# Patient Record
Sex: FEMALE | Race: WHITE | Employment: FULL TIME | URBAN - METROPOLITAN AREA
[De-identification: names, ages, dates, MRNs, and addresses within clinical notes are randomized per-mention and may not be internally consistent; named-entity substitution may affect disease eponyms.]

---

## 2017-05-03 ENCOUNTER — HOSPITAL ENCOUNTER (OUTPATIENT)
Dept: OTHER | Age: 52
Discharge: HOME | End: 2017-05-03
Payer: COMMERCIAL

## 2017-05-03 DIAGNOSIS — M79.671 RIGHT FOOT PAIN: ICD-10-CM

## 2017-05-03 PROCEDURE — 73630 X-RAY EXAM OF FOOT: CPT

## 2017-05-11 ENCOUNTER — HOSPITAL ENCOUNTER (OUTPATIENT)
Dept: MAMMOGRAPHY | Age: 52
Discharge: HOME | End: 2017-05-11
Attending: FAMILY MEDICINE
Payer: COMMERCIAL

## 2017-05-11 DIAGNOSIS — Z12.31 VISIT FOR SCREENING MAMMOGRAM: ICD-10-CM

## 2017-05-11 PROCEDURE — 77063 BREAST TOMOSYNTHESIS BI: CPT

## 2018-02-28 ENCOUNTER — HOSPITAL ENCOUNTER (OUTPATIENT)
Dept: PHYSICAL THERAPY | Age: 53
Discharge: HOME | End: 2018-02-28
Payer: COMMERCIAL

## 2018-02-28 PROCEDURE — 97530 THERAPEUTIC ACTIVITIES: CPT

## 2018-02-28 PROCEDURE — 97162 PT EVAL MOD COMPLEX 30 MIN: CPT

## 2018-02-28 NOTE — PROGRESS NOTES
Adele Encarnacion [address]  [facility] PHYSICAL THERAPY AT [facility] [address], [address], [address] - Phone: [phone]  Fax: [phone] / 9651 [facility]  Patient Name: Santhosh Bradford : 1965   Medical   Diagnosis: Right shoulder pain [M25.511] Treatment Diagnosis: Right shoulder pain   Onset Date: 2018     Referral Source: Ginette Merchant MD Start of Care [facility]): 2018   Prior Hospitalization: See medical history Provider #: [id number]   Prior Level of Function: Independent and pain free overhead reaching   Comorbidities: DM Type 2 and LBP   Medications: Verified on Patient Summary List   The Plan of Care and following information is based on the information from the initial evaluation.   ===========================================================================================  Assessment / key information:  Patient  is a 50 y.o. female who presents to [facility] with diagnosis of Right shoulder pain [M25.511]. Patient reports R sided C/S pain that radiates down the R UE began 2018 while working which resulted in decreased R UE  strength. Reports injection in R shoulder 2018. X-ray of the R shoulder performed 2018 revealed R shoulder bursitis. C/S pain is located on the right side of the neck and described as constant pulling and intermittent pinching. Patient notes numbness and tingling in CINDA UEs. Pain is rated as a 1/10 at the best, 1/10 currently, and 10/10 at the worst. Right sided C/S pain increases with prolonged sitting, raising the right UE overhead, and turning head to the left. Upon objective evaluation patient demonstrates impaired and painful C/S AROM in rotation right, flexion and SB right, postural deviations of FHP and rounded shoulders, impaired strength of cervical flexor muscles, impaired flexibility of R UT and levator scap.  All C/S special testing and red flags were cleared and negative. All C/S red flags were cleared and negative. Patient scored 62 on FOTO indicating decreased function and quality of life. Patient can benefit from PT interventions to improve C/S ROM, cervical flexor strength, flexibility, joint mobility, decrease C/S pain, tone, and TTP to facilitate ADLs & overall functional status.   ===========================================================================================  Problem List: pain affecting function, decrease ROM, decrease strength, decrease ADL/ functional abilities, decrease activity tolerance and decrease flexibility/ joint mobility  Treatment Plan may include any combination of the following: Therapeutic exercise, Therapeutic activities, Neuromuscular re-education, Physical agent/modality, Manual therapy, dry needling, and Patient education  Patient / Family readiness to learn indicated by: asking questions, trying to perform skills and interest  Persons(s) to be included in education: patient (P)  Barriers to Learning/Limitations: no  Measures taken:    Patient Goal (s): \"make it go away\"   Patient self reported health status: good  Rehabilitation Potential: good   Short Term Goals: To be accomplished in  2  weeks:  1) Establish HEP. 2) Patient will report 25% improvement in R UE radicular symptoms with prolonged computer work. 3) Pt will be educated on sitting posture modification to reduce musculoskeletal strain with sitting ADL's.  Long Term Goals: To be accomplished in  4  weeks:  1) Patient to be independent with HEP. 2) Increase FOTO score to 72 indicating improved function and QOL. 3) Improve C/S AROM to pain free and WNLs to facilitate ADLs such as driving. 4) Pt will demonstrate centralization of sx indicating improved functional mobility and quality of life. 5)  Increase R Sh supraspinatus strength to 4+/5 to facilitate lifting groceries.     Frequency / Duration:   Patient to be seen  2  times per week for 3-4  weeks:  Patient / Caregiver education and instruction: self care, activity modification and exercises  G-Codes (GP): n/a  Eval Complexity: History: MEDIUM  Complexity : 1-2 comorbidities / personal factors will impact the outcome/ POC Exam:HIGH Complexity : 4+ Standardized tests and measures addressing body structure, function, activity limitation and / or participation in recreation  Presentation: MEDIUM Complexity : Evolving with changing characteristics  Clinical Decision Making:MEDIUM Complexity : FOTO score of 26-74Overall Complexity:MEDIUM    Therapist Signature: Latoya Lemos Date: 9/28/6644   Certification Period: n/a Time: 4:42 PM   ===========================================================================================  I certify that the above Physical Therapy Services are being furnished while the patient is under my care. I agree with the treatment plan and certify that this therapy is necessary. Physician Signature:        Date:       Time:     Please sign and return to [facility] or you may fax the signed copy to [phone]. Thank you.

## 2018-02-28 NOTE — PROGRESS NOTES
PHYSICAL THERAPY - DAILY TREATMENT NOTE    Patient Name: Ricardo Guaman        Date: 2018  : 1965   YES Patient  Verified  Visit #:   1   of   8  Insurance: Payor: Reji Thorne / Plan: [facility] PPO / Product Type: PPO /      In time: 4:48 Out time: 5:40   Total Treatment Time: 52     Medicare Time Tracking (below)   Total Timed Codes (min):  n/a 1:1 Treatment Time:  n/a     TREATMENT AREA =  Right shoulder pain [M25.511]  SUBJECTIVE  Pain Level (on 0 to 10 scale):  1  / 10   Medication Changes/New allergies or changes in medical history, any new surgeries or procedures? NO    If yes, update Summary List   Subjective Functional Status/Changes:  []  No changes reported     See POC       OBJECTIVE  Modalities Rationale:    n/a    10 min Therapeutic Activity: Pt educated on sitting posture modification to reduce musculoskeletal strain with sitting ADL's anatomy and physiology of the spine    Rationale:    Improve positioning of C/S to improve the patients ability to tolerate prolonged sitting.      min Patient Education:  YES  Reviewed HEP   []  Progressed/Changed HEP based on: Other Objective/Functional Measures:   Physical Therapy Evaluation Cervical Spine - [facility]    SUBJECTIVE  Aggravated by:   [] Bending [] Sitting [] Standing [x] Reaching Overhead   [] Moving [] Cough [] Sneeze [] Eating   [] AM  [] PM  Lying:  [] sup   [] pro   [x] R sidelying   [x] Other: Turn to the left      Eased by:     [x] Other: Nothing     General Health:  Red Flags Indicated? [] Yes    [] No  [] Yes [x] No Recent weight change (If yes, due to dieting?  [] Yes  [] No)   [] Yes [x] No Unremitting pain at night  [] Yes [x] No Dizziness  [] Yes [x] No Blurred vision  [] Yes [x] No Ringing in ears  [] Yes [x] No Difficulty swallowing  [] Yes [x] No Dysfunction of bowel or bladder  [] Yes [x] No Jaw pain    Past History/Treatments:    Diagnostic Tests: [] Lab work [x] X-rays    [] CT [] MRI [] Other:  Results:    Headaches: Do you have headaches? [x] Yes   [] No  How often do you get headaches? Daily     Where is the headache? R occiput    OBJECTIVE  Posture:   Head Position: FHP    Shoulder/Scapular Screen: [x] WNL    [] Abnormal:    Active Movements: [] N/A   [] Too acute   [] Other:  ROM deg AROM %  Comments:pain, area   Forward flexion 30 60%    Extension 46 77%    SB right 40 88%    SB left  45 100%    Rotation right 64 70%    Rotation left 80 89%    Retraction 100%  R shoulder blade   Protraction 100%         Neuro Screen (myotome/dematome/felexes): [] WNL  C3 - T2 CINDA UE light touch sensation WNLs  Myotome Level Muscle Test Myotome Level Muscle Test   C5  C5,6  Shoulder Add - R/L 4+/5-  Biceps - R/L 4+/4+ C8 Finger Flexors - R/L 5-/5-   C6  C7  Wrist Extension - R/L 4/4  Wrist Flexion - R/L 4/4+ T1 Finger Abduction - Interossei R/L NT   C7 Elbow Extension - R/L 5/5  R/L  - 54.6/52.5 and 54.1/52     R/L Shoulder flexion: 160 deg/159 deg  Upper Limb Tension Tests: [x] N/A       Ulnar: [] R    [] L    [] +    [] -       Median: [] R    [] L    [] +    [] -       Radial: [] R    [] L    [] +    [] -    Special Tests:  Cervical:        Spurling's:  [] R    [] L    [] +    [x] -       Distraction:  [] R    [] L    [] +    [x] -       Compression: [] R    [] L    [] +    [x] -    Muscle Flexibility: [] N/A   Scalenes: [x] WNL    [] Tight    [] R    [] L   Upper Trap: [x] WNL    [] Tight    [] R    [] L   Levator: [x] WNL    [] Tight    [] R    [] L   Pect.  Minor: [] WNL    [x] Tight    [x] R    [] L    Global Muscular Weakness: [] N/A   Int Rotators: R/L 4+/5-   Ext Rotators: R/L 4/4              Supraspinatus: R/L 4/4+    Justification for Eval Complexity:   Patient History: MEDIUM  Complexity : 1-2 comorbidities / personal factors will impact the outcome/ POC  (LBP and DM)  Examination:HIGH Complexity : 4+ Standardized tests and measures addressing body structure, function, activity limitation and / or participation in recreation  (See above and POC)  Clinical Presentation: MEDIUM Complexity : Evolving with changing characteristics  (pain level 1/10 on average and 10/10 @ worst, R UE radiating pain, constantpain)  Clinical Decision Making:MEDIUM Complexity : FOTO score of 26-74 (Foto 58/100)  Overall Complexity:MEDIUM     Post Treatment Pain Level (on 0 to 10) scale:   1  / 10     ASSESSMENT  Assessment/Changes in Function:     See POC     []  See Progress Note/Recertification   Patient will continue to benefit from skilled PT services to modify and progress therapeutic interventions, address functional mobility deficits, address ROM deficits, address strength deficits, analyze and address soft tissue restrictions, analyze and cue movement patterns, analyze and modify body mechanics/ergonomics and assess and modify postural abnormalities to attain remaining goals.    Progress toward goals / Updated goals:    See POC     PLAN  [x]  Upgrade activities as tolerated YES Continue plan of care   []  Discharge due to :    []  Other:      Therapist: Lucian Lindsey DPT     Date: 2/28/2018 Time: 4:58 PM     Future Appointments  Date Time Provider Bhavani Vazquez   2/28/2018 5:00 PM [address]

## 2018-03-01 ENCOUNTER — HOSPITAL ENCOUNTER (OUTPATIENT)
Dept: PHYSICAL THERAPY | Age: 53
Discharge: HOME | End: 2018-03-01
Payer: COMMERCIAL

## 2018-03-01 PROCEDURE — 97140 MANUAL THERAPY 1/> REGIONS: CPT

## 2018-03-01 PROCEDURE — 97110 THERAPEUTIC EXERCISES: CPT

## 2018-03-01 NOTE — PROGRESS NOTES
PHYSICAL THERAPY - DAILY TREATMENT NOTE    Patient Name: Onesimo Boland        Date: 3/1/2018  : 1965   YES Patient  Verified  Visit #:     Insurance: Payor: Dian Fonseca / Plan: [facility] PPO / Product Type: PPO /      In time: 11:43 am Out time: 12:28 pm   Total Treatment Time: 45         TREATMENT AREA =  Right shoulder pain [M25.511]    SUBJECTIVE  Pain Level (on 0 to 10 scale):  5/ 10   Medication Changes/New allergies or changes in medical history, any new surgeries or procedures? NO    If yes, update Summary List   Subjective Functional Status/Changes:  []  No changes reported     Pt reports she woke up this morning with pain in R UE to hand.  Radicular sx in R UE multiple x per sabine         OBJECTIVE  Modalities Rationale:     decrease edema, decrease inflammation, decrease pain and increase tissue extensibility to improve patient's ability to perform functional ADLs   min [] Estim, type/location:                                      []  att     []  unatt     []  w/US     []  w/ice    []  w/heat    min []  Mechanical Traction: type/lbs                   []  pro   []  sup   []  int   []  cont    []  before manual    []  after manual    min []  Ultrasound, settings/location:      min []  Iontophoresis w/ dexamethasone, location:                                               []  take home patch       []  in clinic   10 min []  Ice     [x]  Heat    location/position:     min []  Vasopneumatic Device, press/temp:     min []  Other:    [x] Skin assessment post-treatment (if applicable):    [x]  intact    []  redness- no adverse reaction     []redness  adverse reaction:        25 min Therapeutic Exercise:  [x]  See flow sheet   Rationale:      increase ROM and increase strength to improve the patients ability to perform functional ADLs    10 min Manual Therapy: Supine STM/DTM to B C/S paraspinals, STM/DTM to [place] UT with TrPt release, gentle manual cervical traction Rationale:      decrease pain, increase ROM, increase tissue extensibility and decrease trigger points to improve patient's ability to perform functional ADLs     min Patient Education:  YES  Reviewed HEP   []  Progressed/Changed HEP based on: Other Objective/Functional Measures: Add pec stretch, C/S isometrics, supine C/S retraction-centralizing sx, sit C/S retraction -mildly irritating sx in R UE.   scap retraction, sh press     Post Treatment Pain Level (on 0 to 10) scale:   1 / 10-R C/S     ASSESSMENT  Assessment/Changes in Function:   Pt demonstrating fair to good supine manual traction. Sx centralizing to R C/S after visit with improving CINDA C/S AROM rotation ~ 85-90%. Pt education in neutral spine posture in ADLs, activity modification, HEP for pain management. Pt able to abolish R UE. Pt demonstrating better tolerance to exercise in supine position vs sitting. pt demonstrating fair tolerance to supine lying. []  See Progress Note/Recertification   Patient will continue to benefit from skilled PT services to modify and progress therapeutic interventions, address functional mobility deficits, address ROM deficits, address strength deficits, analyze and address soft tissue restrictions, analyze and modify body mechanics/ergonomics, assess and modify postural abnormalities and instruct in home and community integration to attain remaining goals. Progress toward goals / Updated goals:    First visit from initial evaluation. Progressed treatment per plan of care.       PLAN  []  Upgrade activities as tolerated YES Continue plan of care   []  Discharge due to :    []  Other:      Therapist: Kemar Amin PTA    Date: 3/1/2018 Time: 12:28 pm     Future Appointments  Date Time Provider Bhavani Vazquez   3/5/2018 5:00 PM Kyle [address] [place] [address]   3/7/2018 5:30 PM [facility]   3/12/2018 6:00 PM [facility]   3/14/2018 9:30 AM Kemar Amin PTA [facility] 3/19/2018 6:00 PM Kyle Germain [facility]   3/21/2018 9:30 AM Juliocesar Kan PTA [facility]   3/26/2018 6:00 PM Sondra Rock [facility]   3/28/2018 9:30 AM Juliocesar Kan PTA [facility]

## 2018-03-05 ENCOUNTER — HOSPITAL ENCOUNTER (OUTPATIENT)
Dept: PHYSICAL THERAPY | Age: 53
Discharge: HOME | End: 2018-03-05
Payer: COMMERCIAL

## 2018-03-05 PROCEDURE — 97140 MANUAL THERAPY 1/> REGIONS: CPT

## 2018-03-05 PROCEDURE — 97110 THERAPEUTIC EXERCISES: CPT

## 2018-03-05 NOTE — PROGRESS NOTES
PHYSICAL THERAPY - DAILY TREATMENT NOTE    Patient Name: Kavon Fisher        Date: 3/5/2018  : 1965   YES Patient  Verified  Visit #:   3   of   12  Insurance: Payor: Yovani Moreno / Plan: [facility] PPO / Product Type: PPO /      In time: 5:10 pm Out time: 552pm   Total Treatment Time: 42     Medicare Time Tracking (below)   Total Timed Codes (min):  n/a 1:1 Treatment Time:  n/a     TREATMENT AREA =  Right shoulder pain [M25.511]  SUBJECTIVE  Pain Level (on 0 to 10 scale):  1  / 10   Medication Changes/New allergies or changes in medical history, any new surgeries or procedures? NO    If yes, update Summary List   Subjective Functional Status/Changes:  []  No changes reported     Pt states \"thursday after I left it was really bothering me. \"         OBJECTIVE  Modalities Rationale:     decrease pain to improve patient's ability to perform functional ADLs   min [] Estim, type/location:                                      []  att     []  unatt     []  w/US     []  w/ice    []  w/heat    min []  Mechanical Traction: type/lbs                   []  pro   []  sup   []  int   []  cont    []  before manual    []  after manual    min []  Ultrasound, settings/location:      min []  Iontophoresis w/ dexamethasone, location:                                               []  take home patch       []  in clinic   10 min []  Ice     [x]  Heat    location/position: C/S in supine    min []  Vasopneumatic Device, press/temp:     min []  Other:    [x] Skin assessment post-treatment (if applicable):    [x]  intact    []  redness- no adverse reaction     []redness  adverse reaction:        24 min Therapeutic Exercise:  [x]  See flow sheet   Rationale:      increase ROM and increase strength to improve the patients ability to perform ADLs.      8 min Manual Therapy: Supine STM/DTM to B C/S paraspinals, STM/DTM to Excell Bers UT with TrPt release, gentle manual cervical traction   Rationale:      decrease pain, increase ROM, increase tissue extensibility and decrease trigger points in L/S to improve patient's ability to tolerate prolonged standing. min Patient Education:  YES  Reviewed HEP   []  Progressed/Changed HEP based on: Other Objective/Functional Measures:    Pre Test:  Location of sx: R side of the neck   MMT: R sh supraspinatus 4/5 L sh 5-/5   Flexion = 85%  Extension = 50%  R/L Sidebending = 85%/50%  R/L Rot = 85%    Post supine Rep ret:  Location of sx: decr on R C/S   MMT: R supra = 4+/5  Flexion = 65%  Extension = 85%  R/L Sidebending = 75/50%  R/L Rot = 85%    Post sitting Rep ret:  Location of sx: no R C/S Sx  MMT: R supra = 4+/5  Flexion = 65%  Extension = 85%  R/L Sidebending = 75/50%  R/L Rot = 85%         Post Treatment Pain Level (on 0 to 10) scale:   1  / 10     ASSESSMENT  Assessment/Changes in Function:     Gentle traction increased R UE sx. Scapular retraction increased CINDA UE tingling. C/S retraction improved R supraspinatus muscle strength. Demonstrated good form throughout Cervical retraction. []  See Progress Note/Recertification   Patient will continue to benefit from skilled PT services to modify and progress therapeutic interventions, address functional mobility deficits, address ROM deficits, address strength deficits, analyze and address soft tissue restrictions, analyze and cue movement patterns, analyze and modify body mechanics/ergonomics and assess and modify postural abnormalities to attain remaining goals. Progress toward goals / Updated goals:    Progressing towards STG 1.       PLAN  [x]  Upgrade activities as tolerated YES Continue plan of care   []  Discharge due to :    []  Other:      Therapist: Nikki Grider DPT     Date: 3/5/2018 Time: 2:21 PM     Future Appointments  Date Time Provider Bhavani Vazquez   3/5/2018 5:00 PM [facility]   3/7/2018 5:30 PM [facility] [address]   3/12/2018 6:00 PM [facility] 3/14/2018 9:30 AM Lorna Mcarthur PTA [facility]   3/19/2018 6:00 PM [facility]   3/21/2018 9:30 AM Lorna Mcarthur PTA [facility]   3/26/2018 6:00 PM [facility]   3/28/2018 9:30 AM Lorna Mcarthur PTA [facility] [address]

## 2018-03-07 ENCOUNTER — HOSPITAL ENCOUNTER (OUTPATIENT)
Dept: PHYSICAL THERAPY | Age: 53
Discharge: HOME | End: 2018-03-07
Payer: COMMERCIAL

## 2018-03-07 PROCEDURE — 97110 THERAPEUTIC EXERCISES: CPT

## 2018-03-07 PROCEDURE — 97140 MANUAL THERAPY 1/> REGIONS: CPT

## 2018-03-07 NOTE — PROGRESS NOTES
PHYSICAL THERAPY - DAILY TREATMENT NOTE    Patient Name: Carmen Patel        Date: 3/7/2018  : 1965   YES Patient  Verified  Visit #:     Insurance: Payor: Cain Rosario / Plan: [facility] PPO / Product Type: PPO /      In time: 5:07pm Out time: 5:41m   Total Treatment Time: 34     Medicare Time Tracking (below)   Total Timed Codes (min):  n/a 1:1 Treatment Time:  n/a     TREATMENT AREA =  Right shoulder pain [M25.511]  SUBJECTIVE  Pain Level (on 0 to 10 scale):  0  / 10   Medication Changes/New allergies or changes in medical history, any new surgeries or procedures? NO    If yes, update Summary List   Subjective Functional Status/Changes:  []  No changes reported     Pt states \"i was able to clean today and I slept really well last night\"         OBJECTIVE  Modalities Rationale:    N/A    26 min Therapeutic Exercise:  [x]  See flow sheet   Rationale:      increase ROM and increase strength to improve the patients ability to perform ADLs. 8 min Manual Therapy: Supine STM/DTM to B C/S paraspinals, STM/DTM to Lori Grams UT with TrPt release   Rationale:      decrease pain, increase ROM, increase tissue extensibility and decrease trigger points in L/S to improve patient's ability to tolerate prolonged standing. min Patient Education:  YES  Reviewed HEP   []  Progressed/Changed HEP based on:         Other Objective/Functional Measures:    Pre Test:  Location of sx: Central C/S  Flexion = 75%  Extension = 75%  R/L Sidebending = 50/75%  R/L Rot = 85/75    Post Rep Ret in sitting:  Location of sx: Central C/S  Flexion = 85  Extension = 75%  R/L Sidebending = 75%  R/L Rot = 85%     Post Treatment Pain Level (on 0 to 10) scale:   0  / 10     ASSESSMENT  Assessment/Changes in Function:     Pt presented with increased TTP and mm tone in R UT.      []  See Progress Note/Recertification   Patient will continue to benefit from skilled PT services to modify and progress therapeutic interventions, address functional mobility deficits, address ROM deficits, address strength deficits, analyze and address soft tissue restrictions, analyze and cue movement patterns, analyze and modify body mechanics/ergonomics and assess and modify postural abnormalities to attain remaining goals. Progress toward goals / Updated goals:    Progressing towards STG  1-3.   1) Establish HEP. 2) Patient will report 25% improvement in R UE radicular symptoms with prolonged computer work. 3) Pt will be educated on sitting posture modification to reduce musculoskeletal strain with sitting ADL's.       PLAN  [x]  Upgrade activities as tolerated YES Continue plan of care   []  Discharge due to :    []  Other:      Therapist: Adeel oSl DPT     Date: 3/7/2018 Time: 2:29 PM     Future Appointments  Date Time Provider Bhavani Vazquez   3/7/2018 5:30 PM [address]   3/12/2018 6:00 PM [facility]   3/14/2018 9:30 AM Nathanael Ambrosio PTA [facility]   [facility]   3/21/2018 9:30 AM Nathanael Ambrosio PTA [facility]   3/26/2018 6:00 PM [address]   3/28/2018 9:30 AM Nathanael Ambrosio PTA [facility]

## 2018-03-12 ENCOUNTER — HOSPITAL ENCOUNTER (OUTPATIENT)
Dept: PHYSICAL THERAPY | Age: 53
Discharge: HOME | End: 2018-03-12
Payer: COMMERCIAL

## 2018-03-12 PROCEDURE — 97110 THERAPEUTIC EXERCISES: CPT

## 2018-03-12 PROCEDURE — 97140 MANUAL THERAPY 1/> REGIONS: CPT

## 2018-03-12 NOTE — PROGRESS NOTES
PHYSICAL THERAPY - DAILY TREATMENT NOTE    Patient Name: Kia Wesley        Date: 3/12/2018  : 1965   YES Patient  Verified  Visit #:     Insurance: Payor: Nallely Rolon / Plan: [facility] PPO / Product Type: PPO /      In time: 5:31 pm Out time: 6:26 pm   Total Treatment Time: 55     Medicare Time Tracking (below)   Total Timed Codes (min):  n/a 1:1 Treatment Time:  n/a     TREATMENT AREA =  Right shoulder pain [M25.511]  SUBJECTIVE  Pain Level (on 0 to 10 scale):  0  / 10   Medication Changes/New allergies or changes in medical history, any new surgeries or procedures? NO    If yes, update Summary List   Subjective Functional Status/Changes:  []  No changes reported     Pt states \"I havent had any headaches\"         OBJECTIVE  Modalities Rationale: n/a  47 min Therapeutic Exercise:  [x]  See flow sheet   Rationale:      increase ROM and increase strength to improve the patients ability to perform ADLs. 8 min Manual Therapy: STM to R PVMs, UT, levator scapulae, scalenes    Rationale:      decrease pain, increase ROM, increase tissue extensibility and decrease trigger points in L/S to improve patient's ability to tolerate prolonged standing. min Patient Education:  YES  Reviewed HEP   []  Progressed/Changed HEP based on: Other Objective/Functional Measures:    Pre Test:  Location of sx: no sx  MMT: 5-/5  Flexion = 30  Extension = 46  R/L Side bend = 36/45  R/L Rot = 72/68    Post supine ret with extension:  Location of sx: no sx  Flexion = 25  Extension = 68  R/L Side bend= 30/53  R/L Rot = 75/80      Post supine ret:  Location of sx:  MMT: 5-/5  Flexion = 28  Extension = 62  R/L Side bend= 30/52  R/L Rot = 78/72       Post Treatment Pain Level (on 0 to 10) scale:   0  / 10     ASSESSMENT  Assessment/Changes in Function:     Pt presented with slight improvements in R UT and levator scap. R shoulder MMT strong and painfree.  Progressed extension in supine to ret with C/S extension. Updated and issued HEP, reviewed if C/S sx incr or ROM decreases resume supine c/s ret without extension. []  See Progress Note/Recertification   Patient will continue to benefit from skilled PT services to modify and progress therapeutic interventions, address functional mobility deficits, address ROM deficits, address strength deficits, analyze and address soft tissue restrictions, analyze and cue movement patterns, analyze and modify body mechanics/ergonomics and assess and modify postural abnormalities to attain remaining goals. Progress toward goals / Updated goals:    Progressing towards STG 1-3.   1) Establish HEP. 2) Patient will report 25% improvement in R UE radicular symptoms with prolonged computer work. 3) Pt will be educated on sitting posture modification to reduce musculoskeletal strain with sitting ADL's.       PLAN  [x]  Upgrade activities as tolerated YES Continue plan of care   []  Discharge due to :    []  Other:      Therapist: Srikanth Christy DPT     Date: 3/12/2018 Time: 10:22 AM     Future Appointments  Date Time Provider Bhavani Vazquez   3/12/2018 6:00 PM [address]   3/14/2018 9:30 AM Anastacio Alexandra PTA [address]   3/19/2018 6:00 PM [place] [address]   3/21/2018 9:30 AM Anastacio Alexandra PTA [address]   3/26/2018 6:00 PM [place] [address]   3/28/2018 9:30 AM Anastacio Alexandra PTA [address]

## 2018-03-14 ENCOUNTER — HOSPITAL ENCOUNTER (OUTPATIENT)
Dept: PHYSICAL THERAPY | Age: 53
Discharge: HOME | End: 2018-03-14
Payer: COMMERCIAL

## 2018-03-14 PROCEDURE — 97110 THERAPEUTIC EXERCISES: CPT

## 2018-03-14 PROCEDURE — 97140 MANUAL THERAPY 1/> REGIONS: CPT

## 2018-03-14 NOTE — PROGRESS NOTES
PHYSICAL THERAPY - DAILY TREATMENT NOTE    Patient Name: Kimberly Montilla        Date: 3/14/2018  : 1965   YES Patient  Verified  Visit #:     Insurance: Payor: Jarvis Scott / Plan: [facility] PPO / Product Type: PPO /      In time: 9:30 Out time: 10:06   Total Treatment Time: 36     Medicare Time Tracking (below)   Total Timed Codes (min):  na 1:1 Treatment Time:  na     TREATMENT AREA =  Right shoulder pain [M25.511]    SUBJECTIVE  Pain Level (on 0 to 10 scale):  0  / 10   Medication Changes/New allergies or changes in medical history, any new surgeries or procedures? NO    If yes, update Summary List   Subjective Functional Status/Changes:  []  No changes reported     No symptoms since the last time she was here. No RUE symptoms when on computer for 30 minutes. OBJECTIVE      26 min Therapeutic Exercise:  [x]  See flow sheet   Rationale:      increase ROM and increase strength to improve the patients ability to perform ADLs.    10 min Manual Therapy: Release/STM to DTM R SCM, UT and rhomboid mm. DTM R levator scapula. SOR. Rationale:      decrease pain, increase ROM, increase tissue extensibility and decrease trigger points in L/S to improve patient's ability to tolerate prolonged standing.           min Patient Education:  YES  Reviewed HEP   []  Progressed/Changed HEP based on: Other Objective/Functional Measures:    Continued present program.     Post Treatment Pain Level (on 0 to 10) scale:   0  / 10     ASSESSMENT  Assessment/Changes in Function:     Good reduction in symptoms with increasing exercise ability.      []  See Progress Note/Recertification   Patient will continue to benefit from skilled PT services to modify and progress therapeutic interventions, address functional mobility deficits, address ROM deficits, address strength deficits, analyze and address soft tissue restrictions, analyze and cue movement patterns, analyze and modify body mechanics/ergonomics and assess and modify postural abnormalities to attain remaining goals   Progress toward goals / Updated goals:    Progressing towards STG 1-3.   1) Establish HEP. Met  2) Patient will report 25% improvement in R UE radicular symptoms with prolonged computer work.  Met  3) Pt will be educated on sitting posture modification to reduce musculoskeletal strain with sitting ADL's.  Met     PLAN  [x]  Upgrade activities as tolerated YES Continue plan of care   []  Discharge due to :    []  Other:      Therapist: Alexis Colon PT    Date: 3/14/2018 Time: 9:19 AM     Future Appointments  Date Time Provider Bhavani Vazquez   3/14/2018 9:30 AM Alexis Colon PT [facility]   3/19/2018 6:00 PM Kyle [address] [place] [address]   3/21/2018 9:30 AM Krsiti Niño PTA [facility]   3/26/2018 5:00 PM Alexis Colon PT [facility]   3/28/2018 9:30 AM Kristi Niño PTA [facility]

## 2018-03-19 ENCOUNTER — HOSPITAL ENCOUNTER (OUTPATIENT)
Dept: PHYSICAL THERAPY | Age: 53
Discharge: HOME | End: 2018-03-19
Payer: COMMERCIAL

## 2018-03-19 PROCEDURE — 97110 THERAPEUTIC EXERCISES: CPT

## 2018-03-19 NOTE — PROGRESS NOTES
PHYSICAL THERAPY - DAILY TREATMENT NOTE    Patient Name: Maura Young        Date: 3/19/2018  : 1965   YES Patient  Verified  Visit #:     Insurance: Payor: Kyra List / Plan: [facility] PPO / Product Type: PPO /      In time: 4:52 pm Out time: 5:22 pm   Total Treatment Time: 30     Medicare Time Tracking (below)   Total Timed Codes (min):  n/a 1:1 Treatment Time:  n/a     TREATMENT AREA =  Right shoulder pain [M25.511]  SUBJECTIVE  Pain Level (on 0 to 10 scale):  0  / 10   Medication Changes/New allergies or changes in medical history, any new surgeries or procedures? NO    If yes, update Summary List   Subjective Functional Status/Changes:  []  No changes reported     Pt states \"I havent had pain in about 2 weeks I feel about 100% better\"         OBJECTIVE  Modalities Rationale:    PD    30 min Therapeutic Exercise:  [x]  See flow sheet   Rationale:      increase ROM and increase strength to improve the patients ability to perform ADLs. min Patient Education:  YES  Reviewed HEP   []  Progressed/Changed HEP based on: Other Objective/Functional Measures:    See DC   Post Treatment Pain Level (on 0 to 10) scale:   0  / 10     ASSESSMENT  Assessment/Changes in Function:     See DC     []  See Progress Note/Recertification   Patient will continue to benefit from skilled PT services to modify and progress therapeutic interventions, address functional mobility deficits, address ROM deficits, address strength deficits, analyze and address soft tissue restrictions, analyze and cue movement patterns, analyze and modify body mechanics/ergonomics and assess and modify postural abnormalities to attain remaining goals.    Progress toward goals / Updated goals:    See DC     PLAN  [x]  Upgrade activities as tolerated YES Continue plan of care   []  Discharge due to :    []  Other:      Therapist: Janet Rankin DPT     Date: 3/19/2018 Time: 9:55 AM     Future Appointments  Date Time Provider Bhavani Vazquez   [facility]/2018 [facility]

## 2018-03-19 NOTE — PROGRESS NOTES
[address] PHYSICAL THERAPY AT [facility] [address], [address] [facility], [place] [address]  Phone: [phone]  Fax: [phone] SUMMARY  Patient Name: Lam Woodall : 1965   Treatment/Medical Diagnosis: Right shoulder pain [M25.511]   Referral Source: Kyrie Connell MD     Date of Initial Visit: 18 Attended Visits: 7 Missed Visits: 0     SUMMARY OF TREATMENT  Therapeutic exercises including ROM, strengthening, stretching, manual therapy including joint and soft tissue manipulation, balance training, postural re-education, and HEP instruction. CURRENT STATUS  The pt has progressed well with therapy, consistently reporting decreased pain and increased functional ability. Pt reports 100% improvement in neck and shoulder sx since initiating PT services. Based on progress from PT services and pt reported improvement, patient is appropriate for DC to home mgt of sx at this time. Goal/Measure of Progress Goal Met? 1. Patient to be independent with HEP. Status at last Eval: Goal established  Current Status: I with HEP yes   2. Increase FOTO score to 72 indicating improved function and QOL. Status at last Eval: FOTO = 58/100 Current Status: FOTO = 100/100 yes   3. Improve C/S AROM to pain free and WNLs to facilitate ADLs such as driving. Status at last Eval: Flexion = 30  Extension = 46  R/L Sidebending = 40/45  R/L Rot = 64/80 Current Status: Flexion = 35 deg  Extension = 70 deg  R/L Sidebending = 38/55  R/L Rot = 80/75 All Met except flexion AROM   4. Pt will demonstrate centralization of sx indicating improved functional mobility and quality of life. Status at last Eval: R shoulder sx and UE N/T  Current Status: Denies pain pain for >/= to 2 weeks  yes     RECOMMENDATIONS  Discontinue therapy. Progressing towards or have reached established goals. If you have any questions/comments please contact us directly at [phone]. Thank you for allowing us to assist in the care of your patient.     Therapist Signature: Carolyn Notice Date: 3/19/2018     Time: 4:52 PM

## 2018-03-21 ENCOUNTER — APPOINTMENT (OUTPATIENT)
Dept: PHYSICAL THERAPY | Age: 53
End: 2018-03-21
Payer: COMMERCIAL

## 2018-03-26 ENCOUNTER — APPOINTMENT (OUTPATIENT)
Dept: PHYSICAL THERAPY | Age: 53
End: 2018-03-26
Payer: COMMERCIAL

## 2018-03-28 ENCOUNTER — APPOINTMENT (OUTPATIENT)
Dept: PHYSICAL THERAPY | Age: 53
End: 2018-03-28
Payer: COMMERCIAL

## 2018-05-23 ENCOUNTER — HOSPITAL ENCOUNTER (OUTPATIENT)
Dept: MAMMOGRAPHY | Age: 53
Discharge: HOME | End: 2018-05-23
Attending: FAMILY MEDICINE
Payer: COMMERCIAL

## 2018-05-23 DIAGNOSIS — Z12.39 BREAST CANCER SCREENING: ICD-10-CM

## 2018-05-23 PROCEDURE — 77063 BREAST TOMOSYNTHESIS BI: CPT

## 2019-05-29 ENCOUNTER — HOSPITAL ENCOUNTER (OUTPATIENT)
Dept: MAMMOGRAPHY | Age: 54
Discharge: HOME | End: 2019-05-29
Attending: FAMILY MEDICINE
Payer: COMMERCIAL

## 2019-05-29 DIAGNOSIS — Z12.31 VISIT FOR SCREENING MAMMOGRAM: ICD-10-CM

## 2019-05-29 PROCEDURE — 77063 BREAST TOMOSYNTHESIS BI: CPT

## 2020-06-17 ENCOUNTER — HOSPITAL ENCOUNTER (OUTPATIENT)
Dept: MAMMOGRAPHY | Age: 55
Discharge: HOME | End: 2020-06-17
Attending: FAMILY MEDICINE
Payer: COMMERCIAL

## 2020-06-17 DIAGNOSIS — Z12.31 VISIT FOR SCREENING MAMMOGRAM: ICD-10-CM

## 2020-06-17 PROCEDURE — 77063 BREAST TOMOSYNTHESIS BI: CPT

## 2021-07-06 ENCOUNTER — APPOINTMENT (OUTPATIENT)
Dept: PHYSICAL THERAPY | Age: 56
End: 2021-07-06
Payer: COMMERCIAL

## 2021-07-06 ENCOUNTER — HOSPITAL ENCOUNTER (OUTPATIENT)
Dept: PHYSICAL THERAPY | Age: 56
Discharge: HOME | End: 2021-07-06
Payer: COMMERCIAL

## 2021-07-06 PROCEDURE — 97110 THERAPEUTIC EXERCISES: CPT

## 2021-07-06 PROCEDURE — 97162 PT EVAL MOD COMPLEX 30 MIN: CPT

## 2021-07-06 PROCEDURE — 97014 ELECTRIC STIMULATION THERAPY: CPT

## 2021-07-06 NOTE — PROGRESS NOTES
[facility] PHYSICAL THERAPY  [address] [facility] [place], [address] [employer] [address] - Phone: [phone]  Fax: [phone] / [facility] [facility]  Patient Name: Arjun Boyce : 1965   Medical   Diagnosis: Right shoulder pain [M25.511] Treatment Diagnosis: Right shoulder pain   Onset Date: May 2021     Referral Source: Justice Million, * Start of Care [facility] [place]): 2021   Prior Hospitalization: See medical history Provider #: [id number]   Prior Level of Function: Independent,    Comorbidities: DM, cholecystectomy    Medications: Verified on Patient Summary List   The Plan of Care and following information is based on the information from the initial evaluation.   ===========================================================================================  Assessment / key information:  Patient is a 49y.o. year old female with chief complaint of right shoulder pain after twisting her right shoulder when reaching into her refrigerator. Patient reports pain with lifting her right arm, reaching behind her, and disturbed sleep by ~ 80%. Functional limitations: 1) pain with reaching/lifting. Objective findings: 1) decreased AROM in right shoulder, 2) decreased strength in right shoulder (especially right shoulder ER), 3) (+) cross arm test, Neer's test, Davidson test, speeds test, empty can test, and drop arm test in right UE, suggesting rotator cuff involvement. Patient will benefit from skilled PT services to address these issues. Thank you for your referral.    Kellie Damon (Focused on Therapeutic Outcomes) Functional Status score = 47/100, which corresponds to a functional limitation of 53%. ROM:  []?  Unable to assess at this time                                           AROM (deg)                                                    PROM (deg)    Left Right   Left Right   Flexion 160 135 (P! 3/10) Flexion     Extension 60 40 (P! 3/10) Extension       Scaption/ 102 (p! 4/10) Scaption/ABD       ER @ 0 Degrees     ER @ 0 Degrees       ER @ 90 Degrees 87 65 (p! 4/10) ER @ 90 Degrees       IR (HBB) T 6 L 4 (p! 10/10) IR @ 90 Degrees          End Feel / Painful Arc:     Strength:   []? Unable to assess at this time                                                                             L (0-5) R (0-5) Pain   Flexors 5 5 [x]? Yes   []? No   Abductors 5 4 [x]? Yes   []? No   External Rotators 5 3 [x]? Yes   []? No   Internal Rotators 5 4 [x]? Yes   []? No   Supraspinatus 5 3- [x]? Yes   []? No   Infraspinatus 5 3 [x]? Yes   []? No   Lower Trapezius     []? Yes   []? No   Elbow Flexion 5 4 [x]? Yes   []? No   Elbow Extension 5 4- [x]? Yes   []?  No       ===========================================================================================  Eval Complexity: History: MEDIUM  Complexity : 1-2 comorbidities / personal factors will impact the outcome/ POC Exam:MEDIUM Complexity : 3 Standardized tests and measures addressing body structure, function, activity limitation and / or participation in recreation  Presentation: MEDIUM Complexity : Evolving with changing characteristics  Clinical Decision Making:MEDIUM Complexity : FOTO score of 26-74Overall Complexity:MEDIUM    Problem List: pain affecting function, decrease ROM, decrease strength, edema affecting function, decrease ADL/ functional abilitiies, decrease activity tolerance, decrease flexibility/ joint mobility and decrease transfer abilities   Treatment Plan may include any combination of the following: Therapeutic exercise, Therapeutic activities, Neuromuscular re-education, Physical agent/modality, Manual therapy and Patient education  Patient / Family readiness to learn indicated by: asking questions, trying to perform skills and interest  Persons(s) to be included in education: patient (P)  Barriers to Learning/Limitations: None  Measures taken:    Patient Goal (s): \"find out what is wrong with it\"   Patient self reported health status: excellent  Rehabilitation Potential: good   Short Term Goals: To be accomplished in  2  weeks:  1. Patient will be compliant with HEP.  Long Term Goals: To be accomplished in  4  weeks:  1. Patient will increase AROM right shoulder flexion to 160 degrees to improve overhead reach. 2.  Patient will increase strength with right shoulder ER to 4/5 to improve dynamic stability with ADL's.  3.  Patient will increase FOTO Functional Status score to 57/100 to indicate decreased functional limitations. Frequency / Duration:   Patient to be seen  2  times per week for 4  weeks:  Patient / Caregiver education and instruction: self care and exercises    Therapist Signature: Eric Strange PT Date: 8/3/4959   Certification Period: NA Time: 9:06 AM   ===========================================================================================  I certify that the above Physical Therapy Services are being furnished while the patient is under my care. I agree with the treatment plan and certify that this therapy is necessary. Physician Signature:        Date:       Time:                                         Gay Median, *    Please sign and return to [employer] or you may fax the signed copy to [phone]. Thank you.

## 2021-07-06 NOTE — PROGRESS NOTES
PHYSICAL THERAPY - DAILY TREATMENT NOTE    Patient Name: Inder Vicente        Date: 2021  : 1965   YES Patient  Verified  Visit #:   1     Insurance: Payor: Medardo Harper / Plan: Jack Hitchcock RPN / Product Type: Commerical /      In time: 7:53 Out time: 8:43   Total Treatment Time: 50     Medicare Time Tracking (below)   Total Timed Codes (min):  NA 1:1 Treatment Time:  NA     TREATMENT AREA =  Right shoulder    SUBJECTIVE    Pain Level (on 0 to 10 scale):  2  / 10   Medication Changes/New allergies or changes in medical history, any new surgeries or procedures? NO    If yes, update Summary List   Subjective Functional Status/Changes:  []  No changes reported     Reports right shoulder pain began in May 2021 after she twisted her body and right shoulder when reaching into the fridge. States she heard a pop and felt immediate pain. States she could not sleep that night and still has difficulty sleeping. States her sleep is disrupted by ~! 80%. She states x rays have been taken. States she went to the ER and was told it was most likely a biceps tear. States she saw an ortho MD who suspected a rotator cuff tear. States the ortho MD recommended PT. States the pain is gradually worsening.           OBJECTIVE    Physical Therapy Evaluation - Shoulder    Subjective:    Current symptoms/complaints:    Previous treatment:    Posture: [] Poor    [x] Fair    [] Good    Describe: forward head, rounded shoulders    ROM:  [] Unable to assess at this time                                           AROM (deg)                                                    PROM (deg)   Left Right  Left Right   Flexion 160 135 (P! 3/10) Flexion     Extension 60 40 (P! 3/10) Extension     Scaption/ 102 (p! 4/10) Scaption/ABD     ER @ 0 Degrees   ER @ 0 Degrees     ER @ 90 Degrees 87 65 (p! 4/10) ER @ 90 Degrees     IR (HBB) T 6 L 4 (p! 10/10) IR @ 90 Degrees       End Feel / Painful Arc:    Strength:   [] Unable to assess at this time                                                                            L (0-5) R (0-5) Pain   Flexors 5 5 [x] Yes   [] No   Abductors 5 4 [x] Yes   [] No   External Rotators 5 3 [x] Yes   [] No   Internal Rotators 5 4 [x] Yes   [] No   Supraspinatus 5 3- [x] Yes   [] No   Infraspinatus 5 3 [x] Yes   [] No   Lower Trapezius   [] Yes   [] No   Elbow Flexion 5 4 [x] Yes   [] No   Elbow Extension 5 4- [x] Yes   [] No       Scapulohumoral Control / Rhythm:  Able to eccentrically lower with good control? Left: [x] Yes   [] No     Right: [] Yes   [x] No    Palpation  [] Min  [x] Mod  [] Severe    Location: anterior right shoulder  [] Min  [x] Mod  [] Severe    Location: bicipital groove  [] Min  [] Mod  [] Severe    Location:  Adson's Test  [] Pos   [] Neg Drop arm  [x] Pos   [] Neg  Cross Arm  [x] Pos   [] Neg ER lag    [] Pos   [x] Neg  Neer's Test  [x] Pos   [] Neg Clunk Test  [] Pos   [] Neg  Hawkin's Test  [x] Pos   [] Neg AC Joint  [] Pos   [] Neg  Speed's Test  [x] Pos   [] Neg Lift off   [] Pos   [] Neg  Empty Can  [] Pos   [] Neg Pectoral Tightness [] Pos   [] Neg  Anterior Apprehension [] Pos   [] Neg   Posterior Apprehension [] Pos   [] Neg      Modalities Rationale:    decrease edema, decrease inflammation and decrease pain to improve patient's ability to perform ADL's and functional mobility with decreased pain.        10 min [x] Estim, type/location: IFC to right shoulder in sitting                                     []  att     [x]  unatt     []  w/US     [x]  w/ice    []  w/heat    min []  Mechanical Traction: type/lbs                   []  pro   []  sup   []  int   []  cont    []  before manual    []  after manual    min []  Ultrasound, settings/location:      min []  Iontophoresis w/ dexamethasone, location:                                               []  take home patch       []  in clinic    min []  Ice     []  Heat    location/position:     min []  Vasopneumatic Device, press/temp:     min []  Other:    [x] Skin assessment post-treatment (if applicable):    [x]  intact    []  redness- no adverse reaction     []redness  adverse reaction:      10 min Therapeutic Exercise:  [x]  See flow sheet   Rationale:      increase ROM and increase strength to improve the patients ability to perform ADL's and functional mobility with decreased pain and improved joint mechanics. min Patient Education:  YES  Reviewed HEP   []  Progressed/Changed HEP based on:   Discussed POC and issued HEP per handout     Other Objective/Functional Measures:    See eval     Post Treatment Pain Level (on 0 to 10) scale:   0  / 10     ASSESSMENT  Assessment/Changes in Function:     Justification for Eval Code Complexity:  Patient History (low 0, mod 1-2, high 3-4): mod (DM)  Examination (low 1-2, mod 3+, high 4+): mod (see above)  Clinical Presentation (low stable or uncomplicated, mod evolving or changing, high unstable or unpredictable): mod  Clinical Decision Making (low , mod 26-74, high 1-25): FOTO = 47/100 mod     []  See Progress Note/Recertification   Patient will continue to benefit from skilled PT services to modify and progress therapeutic interventions, address functional mobility deficits, address ROM deficits, address strength deficits, analyze and address soft tissue restrictions, analyze and cue movement patterns, analyze and modify body mechanics/ergonomics, assess and modify postural abnormalities and instruct in home and community integration to attain remaining goals. Progress toward goals / Updated goals:    Goals established.       PLAN    [x]  Upgrade activities as tolerated YES Continue plan of care   []  Discharge due to :    []  Other:      Therapist: Eric Strange PT    Date: 7/6/2021 Time: 7:57 AM     Future Appointments   Date Time Provider Bhavani Vazquez   7/6/2021  8:00 AM Salima Estevez, PT [facility]   8/16/2021  9:00 AM [address] [facility] [address] [place] [address] [address]

## 2021-07-12 ENCOUNTER — HOSPITAL ENCOUNTER (OUTPATIENT)
Dept: PHYSICAL THERAPY | Age: 56
Discharge: HOME | End: 2021-07-12
Payer: COMMERCIAL

## 2021-07-12 PROCEDURE — 97110 THERAPEUTIC EXERCISES: CPT

## 2021-07-12 PROCEDURE — 97014 ELECTRIC STIMULATION THERAPY: CPT

## 2021-07-12 NOTE — PROGRESS NOTES
PHYSICAL THERAPY - DAILY TREATMENT NOTE    Patient Name: Inder Vicente        Date: 2021  : 1965   yes Patient  Verified  Visit #:   2   of     Insurance: Payor: Medardo Harper / Plan: Jack Hitchcock RPN / Product Type: Commerical /      In time: 458 Out time: 547   Total Treatment Time: 49     Medicare/[facility] Time Tracking (below)   Total Timed Codes (min):  39 1:1 Treatment Time:  39     TREATMENT AREA =  Right shoulder pain [M25.511]    SUBJECTIVE  Pain Level (on 0 to 10 scale):  0  / 10   Medication Changes/New allergies or changes in medical history, any new surgeries or procedures?    no  If yes, update Summary List   Subjective Functional Status/Changes:  []  No changes reported     \"I am not hurting today.  I was feeling it after last time\"          OBJECTIVE  Modalities Rationale:     decrease inflammation and decrease pain to improve patient's ability to carry/lift/reach/perform ADLs without pain or difficulty   10 min [x] Estim, type/location: IFC to right SH in fowlers position                                     []  att     [x]  unatt     []  w/US     [x]  w/ice    []  w/heat   [x]Skin assessment post-treatment (if applicable):    [x]  intact    []  redness- no adverse reaction                  []redness  adverse reaction:      39 min Therapeutic Exercise:  [x]  See flow sheet   Rationale:      increase ROM and increase strength to improve the patients ability to carry/lift/reach/perform ADLs without pain or difficulty        Billed With/As:   [x] TE   [x] TA   [] Neuro   [] Self Care Patient Education: [x] Review HEP    [] Progressed/Changed HEP based on:   [x] positioning   [x] body mechanics   [x] transfers   [] heat/ice application    [x] other: Pt ed on importance and benefits of compliance with HEP, core strength/stability and proper posture; pt verbalized understanding  issued OTB for HEP   See updated HEP in chart       Other Objective/Functional Measures:  VCs + demo to perform proper technique for TE  Initiated TE per flowsheet without min p!    reviewed proper sleeping positions; reviewed proper posture and mechanics for work duties and Instructed pt to apply ice with elevation >/=3x daily with increased activity to manage pain/inflammation; pt verbalized understanding  pain with AAROM SH abd, held at this time    Post Treatment Pain Level (on 0 to 10) scale:   0  / 10     ASSESSMENT  Assessment/Changes in Function:   Progressed TE without c/o increase p!  limited with abd AROM > 80 degs     []  See Progress Note/Recertification   Patient will continue to benefit from skilled PT services to modify and progress therapeutic interventions, address functional mobility deficits, address ROM deficits, address strength deficits, analyze and address soft tissue restrictions, analyze and cue movement patterns, analyze and modify body mechanics/ergonomics, assess and modify postural abnormalities and instruct in home and community integration to attain remaining goals.    Progress toward goals / Updated goals:  Pt's first visit since [facility], no noted progress        PLAN  [x]  Upgrade activities as tolerated yes Continue plan of care   []  Discharge due to :    []  Other:      Therapist: Gareth Sandy PTA    Date: 7/12/2021 Time: 4:08 PM     Future Appointments   Date Time Provider Bhavani Vazquez   7/12/2021  5:00 PM Dave Nava [facility]   7/14/2021  4:15 PM Reba Spicer PT [facility]   7/19/2021  5:00 PM Laura Keene PTA [facility]   7/21/2021  4:15 PM Laura Keene PTA [facility]   7/26/2021  5:00 PM Laura Keene PTA [facility]   7/28/2021  4:15 PM Reba Spicer PT [facility]   8/16/2021  9:00 AM [address]EREO [address]

## 2021-07-14 ENCOUNTER — HOSPITAL ENCOUNTER (OUTPATIENT)
Dept: PHYSICAL THERAPY | Age: 56
Discharge: HOME | End: 2021-07-14
Payer: COMMERCIAL

## 2021-07-14 PROCEDURE — 97110 THERAPEUTIC EXERCISES: CPT

## 2021-07-14 PROCEDURE — 97140 MANUAL THERAPY 1/> REGIONS: CPT

## 2021-07-14 NOTE — PROGRESS NOTES
PHYSICAL THERAPY - DAILY TREATMENT NOTE    Patient Name: Vitor Mcdermott        Date: 2021  : 1965   YES Patient  Verified  Visit #:   3     Insurance: Payor: Jyoti President / Plan: Kenn Almonte RPN / Product Type: Commerical /      In time: 359 Out time: 4:45   Total Treatment Time: 46     Medicare/[employer] [place] Time Tracking (below)   Total Timed Codes (min):  46 1:1 Treatment Time:  46     TREATMENT AREA =  Right shoulder pain [M25.511]    SUBJECTIVE    Pain Level (on 0 to 10 scale):  2  / 10   Medication Changes/New allergies or changes in medical history, any new surgeries or procedures? NO    If yes, update Summary List   Subjective Functional Status/Changes:  []  No changes reported     Patient reports having pain all night last night, had to wake up to take a muscle relaxor and a pain pill. OBJECTIVE    38 min Therapeutic Exercise:  [x]  See flow sheet   Rationale:      increase ROM and increase strength to improve the patients ability to perform ADL's with greater ease. 8 min Manual Therapy: See flow sheet  KT tape to R shoulder girdle    Rationale:      decrease pain and increase postural awareness to improve patient's ability to perform functional mobility with less compensation and pain. The manual therapy interventions were performed at a separate and distinct time from the therapeutic activities interventions       min Patient Education:  YES  Reviewed HEP   []  Progressed/Changed HEP based on:   Cont with HEP     Other Objective/Functional Measures:    Progressed with strengthening exercises, tolerated well. Post Treatment Pain Level (on 0 to 10) scale:   0  / 10     ASSESSMENT    Assessment/Changes in Function:     Assess response to KT tape next visit.  Tolerated session well, most pain with active IR behind back     []  See Progress Note/Recertification   Patient will continue to benefit from skilled PT services to analyze, cue, progress, modify,, demonstrate, instruct, and address, movement patterns, therapeutic interventions, postural abnormalities, soft tissue restrictions, ROM, strength, functional mobility, body mechanics/ergonomics, and home and community integration, to attain remaining goals. Progress toward goals / Updated goals:    · Long Term Goals: To be accomplished in  4  weeks:  1. Patient will increase AROM right shoulder flexion to 160 degrees to improve overhead reach. 2.  Patient will increase strength with right shoulder ER to 4/5 to improve dynamic stability with ADL's.  3.  Patient will increase FOTO Functional Status score to 57/100 to indicate decreased functional limitations.      PLAN    []  Upgrade activities as tolerated YES Continue plan of care   []  Discharge due to :    []  Other:      Therapist: Nadira Bond PT, DPT    Date: 7/14/2021 Time: 8:03 AM     Future Appointments   Date Time Provider Bhavani Vazquez   7/14/2021  4:15 PM Joseph Parents, PT [facility]   7/19/2021  5:00 PM Miladis Nathan, KIERSTEN [facility]   7/21/2021  4:15 PM Miladis Nathan, PTA [facility]   7/26/2021  5:00 PM Miladis Nathan, PTA [facility]   7/28/2021  4:15 PM Joseph Parents, PT [facility]   8/16/2021  9:00 AM [facility] [address] [place] [address]

## 2021-07-19 ENCOUNTER — APPOINTMENT (OUTPATIENT)
Dept: PHYSICAL THERAPY | Age: 56
End: 2021-07-19
Payer: COMMERCIAL

## 2021-07-21 ENCOUNTER — HOSPITAL ENCOUNTER (OUTPATIENT)
Dept: PHYSICAL THERAPY | Age: 56
Discharge: HOME | End: 2021-07-21
Payer: COMMERCIAL

## 2021-07-21 PROCEDURE — 97110 THERAPEUTIC EXERCISES: CPT

## 2021-07-21 NOTE — PROGRESS NOTES
PHYSICAL THERAPY - DAILY TREATMENT NOTE    Patient Name: Lavern Cárdenas        Date: 2021  : 1965   YES Patient  Verified  Visit #:   4     Insurance: Payor: Paul Benitez / Plan: Alejo SIMS / Product Type: Commerical /      In time: 4:12 Out time: 4:50   Total Treatment Time: 38     Medicare/[employer] [place] Time Tracking (below)   Total Timed Codes (min):  38 1:1 Treatment Time:  38     TREATMENT AREA =  Right shoulder pain [M25.511]    SUBJECTIVE    Pain Level (on 0 to 10 scale):  7-8  / 10   Medication Changes/New allergies or changes in medical history, any new surgeries or procedures? NO    If yes, update Summary List   Subjective Functional Status/Changes:  []  No changes reported     It hurt a lot after last visit. I got off work early today and took it easy so my pain isn't very bad right now. But earlier it was a -8/10, I even had pain just drying my hands with a paper towel. Pt reports sugar levels have been reading higher lately - yesterday's reading was 247 and the day before was >300. OBJECTIVE    38 min Therapeutic Exercise:  [x]  See flow sheet   Rationale:      increase ROM and increase strength to improve the patients ability to perform ADL's with greater ease. min Patient Education:  YES  Reviewed HEP   []  Progressed/Changed HEP based on:   Cont with HEP     Other Objective/Functional Measures:    Eliminated IR stretch and scap/FL exercises from last visit. Post Treatment Pain Level (on 0 to 10) scale:   4  / 10     ASSESSMENT    Assessment/Changes in Function:     Pt with continuation of pain symptoms throughout R shoulder joint. Pt unable to reach behind (IR+EXT plane of motion) without onset of pain.       []  See Progress Note/Recertification   Patient will continue to benefit from skilled PT services to analyze, cue, progress, modify,, demonstrate, instruct, and address, movement patterns, therapeutic interventions, postural abnormalities, soft tissue restrictions, ROM, strength, functional mobility, body mechanics/ergonomics, and home and community integration, to attain remaining goals. Progress toward goals / Updated goals:    · Long Term Goals: To be accomplished in  4  weeks:  1.  Patient will increase AROM right shoulder flexion to 160 degrees to improve overhead reach. 2.  Patient will increase strength with right shoulder ER to 4/5 to improve dynamic stability with ADL's. 3.  Patient will increase FOTO Functional Status score to 57/100 to indicate decreased functional limitations.        PLAN    []  Upgrade activities as tolerated YES Continue plan of care   []  Discharge due to :    []  Other:      Therapist: Glory Schulz PT, DPT    Date: 7/21/2021 Time: 11:18 AM     Future Appointments   Date Time Provider Bhavani Vazquez   7/21/2021  4:15 PM Luisa Perez PT [facility]   7/26/2021  5:00 PM Natalio Reyna [facility]   7/28/2021  4:15 PM Luisa Perez PT [facility]   8/16/2021  9:00 AM [address] [facility]

## 2021-07-26 ENCOUNTER — APPOINTMENT (OUTPATIENT)
Dept: PHYSICAL THERAPY | Age: 56
End: 2021-07-26
Payer: COMMERCIAL

## 2021-07-28 ENCOUNTER — HOSPITAL ENCOUNTER (OUTPATIENT)
Dept: PHYSICAL THERAPY | Age: 56
End: 2021-07-28
Payer: COMMERCIAL

## 2021-07-28 NOTE — PROGRESS NOTES
[facility] PHYSICAL THERAPY  [address] [facility] [address] [place], [address] - Phone: [phone]  Fax: [phone]  DISCHARGE SUMMARY FOR PHYSICAL THERAPY          Patient Name: Grant Thapa : 1965   Treatment/Medical Diagnosis: Right shoulder pain [M25.511]   Onset Date: May 2021    Referral Source: Birdia Osler, * Start of Care [facility]): 2021   Prior Hospitalization: See medical history Provider #: [id number]   Prior Level of Function: Independent    Comorbidities: DM, cholecystectomy    Medications: Verified on Patient Summary List   Visits from [facility]: 4 Missed Visits: 1     Goal/Measure of Progress Goal Met? 1. Patient will increase AROM right shoulder flexion to 160 degrees to improve overhead reach. Status at last Eval: 135* Current Status: 135* no   2. Patient will increase strength with right shoulder ER to 4/5 to improve dynamic stability with ADL's. Status at last Eval: 3 Current Status: 3 no   3. Patient will increase FOTO Functional Status score to 57/100 to indicate decreased functional limitations. Status at last Eval: 47 Current Status: 47 no   4. Patient will be compliant with HEP. Status at last Eval: Unable Current Status: Ongoing  n/a     Key Functional Changes/Progress: Pt called to self discharge today. She stated her right shoulder pain has gotten worse since initiating PT and will be returning to her doctor for other suggestions for her pain symptoms. Pt has not met all of her short and long term goals at this time but is being discharged today secondary to above factors. Thank you for allowing me the opportunity to assist in this case. Assessments/Recommendations: Other: Self discharge secondary to increasing symptoms. If you have any questions/comments please contact us directly at [phone]. Thank you for allowing us to assist in the care of your patient.     Therapist Signature: Glory Schulz PTBRANDONT Date: 7/28/2021   Reporting Period: NA Time: 8:06 PM      Certification Period: NA       NOTE TO PHYSICIAN:  PLEASE COMPLETE THE ORDERS BELOW AND FAX TO   [facility] Physical Therapy: [phone]. If you are unable to process this request in 24 hours please contact our office: [phone].    ___ I have read the above report and request that my patient be discharged from therapy.      Physician Signature:        Date:       Time:

## 2021-08-16 ENCOUNTER — HOSPITAL ENCOUNTER (OUTPATIENT)
Dept: WOMENS IMAGING | Age: 56
Discharge: HOME | End: 2021-08-16
Attending: FAMILY MEDICINE
Payer: COMMERCIAL

## 2021-08-16 DIAGNOSIS — Z12.31 VISIT FOR SCREENING MAMMOGRAM: ICD-10-CM

## 2021-08-16 PROCEDURE — 77063 BREAST TOMOSYNTHESIS BI: CPT

## 2021-08-26 ENCOUNTER — HOSPITAL ENCOUNTER (OUTPATIENT)
Dept: WOMENS IMAGING | Age: 56
Discharge: HOME | End: 2021-08-26
Attending: FAMILY MEDICINE
Payer: COMMERCIAL

## 2021-08-26 ENCOUNTER — HOSPITAL ENCOUNTER (OUTPATIENT)
Dept: ULTRASOUND IMAGING | Age: 56
Discharge: HOME | End: 2021-08-26
Attending: FAMILY MEDICINE
Payer: COMMERCIAL

## 2021-08-26 DIAGNOSIS — R92.8 ABNORMAL FINDING ON BREAST IMAGING: ICD-10-CM

## 2021-08-26 PROCEDURE — 76642 ULTRASOUND BREAST LIMITED: CPT

## 2021-08-26 PROCEDURE — 77062 BREAST TOMOSYNTHESIS BI: CPT

## 2021-09-21 ENCOUNTER — HOSPITAL ENCOUNTER (OUTPATIENT)
Dept: WOMENS IMAGING | Age: 56
Discharge: HOME | End: 2021-09-21
Attending: FAMILY MEDICINE
Payer: COMMERCIAL

## 2021-09-21 DIAGNOSIS — R92.8 ABNORMAL FINDING ON BREAST IMAGING: ICD-10-CM

## 2021-09-21 PROCEDURE — 77030013689 MAM 3D TOMO STEREO VAC BX BREAST LT 1ST LES W/CLIP SPEC

## 2021-09-21 PROCEDURE — 88305 TISSUE EXAM BY PATHOLOGIST: CPT

## 2021-09-21 PROCEDURE — 77065 DX MAMMO INCL CAD UNI: CPT

## 2021-09-21 PROCEDURE — 74011000250 HC RX REV CODE- 250: Performed by: STUDENT IN AN ORGANIZED HEALTH CARE EDUCATION/TRAINING PROGRAM

## 2021-09-21 RX ORDER — LIDOCAINE HYDROCHLORIDE 10 MG/ML
1-15 INJECTION, SOLUTION EPIDURAL; INFILTRATION; INTRACAUDAL; PERINEURAL
Status: DISCONTINUED | OUTPATIENT
Start: 2021-09-21 | End: 2021-09-25 | Stop reason: HOSPADM

## 2021-09-21 RX ORDER — LIDOCAINE HYDROCHLORIDE AND EPINEPHRINE 10; 10 MG/ML; UG/ML
1-20 INJECTION, SOLUTION INFILTRATION; PERINEURAL
Status: DISCONTINUED | OUTPATIENT
Start: 2021-09-21 | End: 2021-09-25 | Stop reason: HOSPADM

## 2021-09-21 RX ADMIN — LIDOCAINE HYDROCHLORIDE 5 ML: 10 INJECTION, SOLUTION EPIDURAL; INFILTRATION; INTRACAUDAL at 11:07

## 2021-09-21 RX ADMIN — LIDOCAINE HYDROCHLORIDE,EPINEPHRINE BITARTRATE 10 ML: 10; .01 INJECTION, SOLUTION INFILTRATION; PERINEURAL at 11:10

## 2021-09-21 RX ADMIN — LIDOCAINE HYDROCHLORIDE,EPINEPHRINE BITARTRATE 10 ML: 10; .01 INJECTION, SOLUTION INFILTRATION; PERINEURAL at 10:07

## 2021-09-21 NOTE — PROGRESS NOTES
Patient arrived for left breast stereotactic biopsy with clip placement. Pt arrived in [facility] ambulatory, alert and oriented. Pt tolerated biopsy well without complaint. Specimens obtained, and placed in labeled specimen containers for pathology. Direct pressure applied to incision site, bleeding controlled, steri- strips applied and covered with gauze prior to post mammogram films for confirmation of clip placement. Clean dry gauze and Ice pack applied and bra on. Reviewed post procedure discharge instructions with patient. Patient verbalizes understanding and written copy given to patient.  Patient D/C'd 11:34 AM

## 2021-11-30 ENCOUNTER — HOSPITAL ENCOUNTER (OUTPATIENT)
Dept: WOMENS IMAGING | Age: 56
Discharge: HOME | End: 2021-11-30
Attending: FAMILY MEDICINE
Payer: COMMERCIAL

## 2021-11-30 DIAGNOSIS — R92.8 ABNORMAL FINDING ON BREAST IMAGING: ICD-10-CM

## 2021-11-30 PROCEDURE — 77061 BREAST TOMOSYNTHESIS UNI: CPT

## 2022-02-01 ENCOUNTER — HOSPITAL ENCOUNTER (OUTPATIENT)
Dept: PHYSICAL THERAPY | Age: 57
Discharge: HOME | End: 2022-02-01
Payer: COMMERCIAL

## 2022-02-01 PROCEDURE — 97110 THERAPEUTIC EXERCISES: CPT

## 2022-02-01 PROCEDURE — 97162 PT EVAL MOD COMPLEX 30 MIN: CPT

## 2022-02-01 NOTE — PROGRESS NOTES
PHYSICAL THERAPY - DAILY TREATMENT NOTE    Patient Name: Shea Sepulveda        Date: 2022  : 1965   YES Patient  Verified  Visit #:   1   of   8  Insurance: Payor: Deandre Aguirre / Plan: Timothy Talbot RPN / Product Type: Commerical /      In time: 4:45 Out time: 5:20   Total Treatment Time: 35     Medicare Time Tracking (below)   Total Timed Codes (min):  10 1:1 Treatment Time:  10     TREATMENT AREA =  Right RC repair    SUBJECTIVE    Pain Level (on 0 to 10 scale):  0  / 10   Medication Changes/New allergies or changes in medical history, any new surgeries or procedures? NO    If yes, update Summary List   Subjective Functional Status/Changes:  []  No changes reported     Patient was previously treated in this PT clinic for right shoulder pain without success. She returned to her MD and obtained an MRI. She then underwent right RC repair on 1/10/2022. She reports compliance with sling.            OBJECTIVE    Physical Therapy Evaluation - Shoulder    Subjective:    Current symptoms/complaints:    Previous treatment:    Posture: [] Poor    [x] Fair    [x] Good    Describe:    ROM:  [] Unable to assess at this time                                           AROM (deg)                                                    PROM (deg)   Left Right  Left Right   Flexion   Flexion  50   Extension   Extension     Scaption/ABD   Scaption/ABD  60   ER @ 0 Degrees   ER @ 45 Degrees abd  5   ER @ 90 Degrees   ER @ 90 Degrees     IR @ 90 Degrees   IR @ 45 Degrees abd  15     End Feel / Painful Arc:    Strength:   [x] Unable to assess at this time                                                                            L (0-5) R (0-5) Pain   Flexors   [] Yes   [] No   Abductors   [] Yes   [] No   External Rotators   [] Yes   [] No   Internal Rotators   [] Yes   [] No   Supraspinatus   [] Yes   [] No   Serratus Anterior   [] Yes   [] No   Lower Trapezius   [] Yes   [] No   Elbow Flexion   [] Yes   [] No   Elbow Extension   [] Yes   [] No       Scapulohumoral Control / Rhythm:  Able to eccentrically lower with good control? Left: [] Yes   [] No     Right: [] Yes   [] No    Palpation  [] Min  [] Mod  [] Severe    Location:  [] Min  [] Mod  [] Severe    Location:  [] Min  [] Mod  [] Severe    Location:  Adson's Test  [] Pos   [] Neg Drop arm  [] Pos   [] Neg  Cross Arm  [] Pos   [] Neg ER lag    [] Pos   [] Neg  Neer's Test  [] Pos   [] Neg Clunk Test  [] Pos   [] Neg  Hawkin's Test  [] Pos   [] Neg AC Joint  [] Pos   [] Neg  Speed's Test  [] Pos   [] Neg Lift off   [] Pos   [] Neg  Empty Can  [] Pos   [] Neg Pectoral Tightness [] Pos   [] Neg  Anterior Apprehension [] Pos   [] Neg   Posterior Apprehension [] Pos   [] Neg    Neurological (upper):  Myotome Level Muscle Test Nerve Reflex Sensation   C5 Deltoid (C5&C6) Axillary N/A Acromion to Lateral Epicondyle    Biceps (C5&C6) Musculocutaneous Biceps Tendon Purest patch at the axillary nerve at the middle deltoid    Rhomboid C5 Dorsal Scapular      Supraspinatus & Infraspinatus (C5&C6) Suprascapular      Teres Minor (C5&C6) Axillary     C6 Wrist Extensor Group (C6&C7) Radial Brachioradialis Lateral Forearm and the \"6\" with the fingers    ECRL/ECRB Radial      Supinator Deep Branch Radial or PIN      ECU (C6&C7) Posterior Interosseus     C7 Triceps (C7&C8) Radial Triceps Middle Finger    Extensor Indicis C6/7/8 Posterior Interosseus      FCR C7 Median     C8 Abductor Policis Brevis (B7&W2) Median Nerve Recurrent Branch N/A Medial Forearm    Flexor Digitorum Superficialis C8 Median     T1 Dorsal Interossei T1 Ulnar Nerve N/A Medial Upper Arm    Abductor Digiti Quinti T1 Ulnar Nerve       Notable Deficits from above: NA    20 min Therapeutic Exercise:  [x]  See flow sheet   Rationale:      increase ROM to improve the patients ability to prevent contracture during passive phase of protocol.         min Patient Education:  YES  Reviewed HEP   []  Progressed/Changed HEP based on: Issued HEH per handout     Other Objective/Functional Measures:    See eval     Post Treatment Pain Level (on 0 to 10) scale:   0  / 10     ASSESSMENT  Assessment/Changes in Function:     Justification for Eval Code Complexity:  Patient History (low 0, mod 1-2, high 3-4): mod (DM)  Examination (low 1-2, mod 3+, high 4+): mod (see above)  Clinical Presentation (low stable or uncomplicated, mod evolving or changing, high unstable or unpredictable): mod  Clinical Decision Making (low , mod 26-74, high 1-25): FOTO = 4/100 high     []  See Progress Note/Recertification   Patient will continue to benefit from skilled PT services to modify and progress therapeutic interventions, address functional mobility deficits, address ROM deficits, address strength deficits, analyze and address soft tissue restrictions, analyze and cue movement patterns, analyze and modify body mechanics/ergonomics, assess and modify postural abnormalities and instruct in home and community integration to attain remaining goals. Progress toward goals / Updated goals:    Goals established.       PLAN    [x]  Upgrade activities as tolerated YES Continue plan of care   []  Discharge due to :    []  Other:      Therapist: Roger Bridges PT    Date: 2/1/2022 Time: 4:46 PM     Future Appointments   Date Time Provider Bhavani Vazquez   2/1/2022  5:00 PM Jennifer Calderon PT [facility]   2/3/2022  4:15 PM Tressa Muñoz PT [facility]   2/7/2022  5:00 PM [address] [facility]   2/8/2022  5:00 PM Tressa Muñoz PT [facility]   2/15/2022  5:00 PM Tressa Muñoz PT [facility]   8/17/2022  1:00 PM [address]

## 2022-02-01 NOTE — PROGRESS NOTES
[address] [place] [address] PHYSICAL THERAPY  [address] [place] [address] Ricardo Kirkland, [address] - Phone: [phone]  Fax: [phone] / [address] [address] PHYSICAL THERAPY SERVICES  Patient Name: Fili Banda : 1965   Medical   Diagnosis: Right arm pain [M79.601] Treatment Diagnosis: Right RC repair   Onset Date: 1/10/2022     Referral Source: Cheko Kovacs MD [facility]): 2022   Prior Hospitalization: See medical history Provider #: [id number]   Prior Level of Function: independent    Comorbidities: DM   Medications: Verified on Patient Summary List   The Plan of Care and following information is based on the information from the initial evaluation.   ===========================================================================================  Assessment / key information:  Patient is a 49y.o. year old female s/p right RC repair. Patient reports she underwent surgery on her right shoulder after conservative treatments failed to decrease pain. She reports compliance with sling. Objective findings: 1) decreased PROM in right shoulder, 2) right elbow PROM is WNL, 3) incision sites appear closed at this time. Patient reports she thinks her right bicep tendon was repaired as well. Therefore, did not assess elbow AROM. Will call to get op note for clarification of surgical procedure. Patient was educated on surgical precautions and importance of avoiding AROM in right shoulder to allow healing. She verbalized understanding. Patient will benefit from skilled PT services to address these issues. Thank you for your referral.    Hernando Cadena (Focused on Therapeutic Outcomes) Functional Status score = 4/100, which corresponds to a functional limitation of 96%. ROM:  []?  Unable to assess at this time                                           AROM (deg)                                                    PROM (deg)    Left Right   Left Right Flexion     Flexion   50   Extension     Extension       Scaption/ABD     Scaption/ABD   60   ER @ 0 Degrees     ER @ 45 Degrees abd   5   ER @ 90 Degrees     ER @ 90 Degrees       IR @ 90 Degrees     IR @ 45 Degrees abd   15       ===========================================================================================  Eval Complexity: History: MEDIUM  Complexity : 1-2 comorbidities / personal factors will impact the outcome/ POC Exam:MEDIUM Complexity : 3 Standardized tests and measures addressing body structure, function, activity limitation and / or participation in recreation  Presentation: MEDIUM Complexity : Evolving with changing characteristics  Clinical Decision Making:HIGH Complexity : FOTO score of 1- 25 Overall Complexity:MEDIUM    Problem List: pain affecting function, decrease ROM, decrease strength, decrease ADL/ functional abilitiies, decrease activity tolerance, decrease flexibility/ joint mobility and decrease transfer abilities   Treatment Plan may include any combination of the following: Therapeutic exercise, Therapeutic activities, Neuromuscular re-education, Physical agent/modality, Manual therapy and Patient education  Patient / Family readiness to learn indicated by: asking questions, trying to perform skills and interest  Persons(s) to be included in education: patient (P)  Barriers to Learning/Limitations: None  Measures taken:    Patient Goal (s): \"get better\"   Patient self reported health status: good  Rehabilitation Potential: good   Short Term Goals: To be accomplished in  2  weeks:  1. Patient will increase PROM right shoulder flexion to 190 degrees to prevent contracture during passive phase of protocol. 2.  Patient will increase PROM right shoulder abduction to 90 deg prevent contracture during passive phase of protocol. 3.  Patient will be compliant with HEP.   4.  Patient will increase PROM right shoulder ER to 25 deg to prevent contracture during passive phase of protocol.  Long Term Goals: To be accomplished in  4  weeks:  1. Patient will increase PROM right shoulder flexion to 120 degrees to prevent contracture during passive phase of protocol. 2.  Patient will increase PROM right shoulder abduction to 110 deg prevent contracture during passive phase of protocol. 3.  Patient will be compliant with HEP. 4.  Patient will increase PROM right shoulder ER to 50 deg to prevent contracture during passive phase of protocol. Frequency / Duration:   Patient to be seen  2  times per week for 4  weeks:  Patient / Caregiver education and instruction: self care and exercises    Therapist Signature: Hermilo Martínez PT Date: [birth year]   Certification Period: NA Time: 5:31 PM   ===========================================================================================  I certify that the above Physical Therapy Services are being furnished while the patient is under my care. I agree with the treatment plan and certify that this therapy is necessary. Physician Signature:        Date:       Time:                                         Qasim Li MD    Please sign and return to [facility] [employer] or you may fax the signed copy to [phone]. Thank you.

## 2022-02-03 ENCOUNTER — HOSPITAL ENCOUNTER (OUTPATIENT)
Dept: PHYSICAL THERAPY | Age: 57
Discharge: HOME | End: 2022-02-03
Payer: COMMERCIAL

## 2022-02-03 PROCEDURE — 97110 THERAPEUTIC EXERCISES: CPT

## 2022-02-03 NOTE — PROGRESS NOTES
PHYSICAL THERAPY - DAILY TREATMENT NOTE    Patient Name: Olivia Dunham        Date: 2/3/2022  : 1965   YES Patient  Verified  Visit #:   2   of   8  Insurance: Payor: Clinton Barcenas / Plan: Laura Hamlin RPN / Product Type: Commerical /      In time: 4:12 Out time: 4:35   Total Treatment Time: 23     Medicare/BCBS [place] Time Tracking (below)   Total Timed Codes (min):  NA 1:1 Treatment Time:  NA     TREATMENT AREA =  Right arm pain [M79.601]  SUBJECTIVE    Pain Level (on 0 to 10 scale):  0  / 10   Medication Changes/New allergies or changes in medical history, any new surgeries or procedures?     NO    If yes, update Summary List   Subjective Functional Status/Changes:  []  No changes reported     Pt without complaints          OBJECTIVE    Modalities Rationale:  decrease pain to improve patient's ability to perform ADLs/IADLs without increased pain       min [] Estim, type/location:                                      []  att     []  unatt     []  w/US     []  w/ice    []  w/heat    min []  Mechanical Traction: type/lbs                   []  pro   []  sup   []  int   []  cont    []  before manual    []  after manual    min []  Ultrasound, settings/location:      min []  Iontophoresis w/ dexamethasone, location:                                               []  take home patch       []  in clinic   5 min [x]  Ice     []  Heat    location/position: Right shoulder, supine    min []  Vasopneumatic Device, press/temp:     min []  Other:    [x] Skin assessment post-treatment (if applicable):    [x]  intact    []  redness- no adverse reaction     []redness - adverse reaction:      18 min Therapeutic Exercise:  [x]  See flow sheet (includes PROM right shoulder IR/ER, flex/scap)   Rationale:      increase ROM to improve the patients ability to perform ADLs/IADLs with increased safety and independence and decreased pain     During TE min Patient Education:  YES  Reviewed HEP   []  Progressed/Changed HEP based on: Cont HEP     Other Objective/Functional Measures:    Exercises per flow sheet     Post Treatment Pain Level (on 0 to 10) scale:   2  / 10     ASSESSMENT    Assessment/Changes in Function:     Tolerated exercises without complaints     []  See Progress Note/Recertification   Patient will continue to benefit from skilled PT services to modify and progress therapeutic interventions, address functional mobility deficits, address ROM deficits, address strength deficits, analyze and address soft tissue restrictions, analyze and cue movement patterns, analyze and modify body mechanics/ergonomics, assess and modify postural abnormalities and instruct in home and community integration to attain remaining goals. Progress toward goals / Updated goals:    Progressing toward goals: · Short Term Goals: To be accomplished in  2  weeks:  1. Patient will increase PROM right shoulder flexion to 190 degrees to prevent contracture during passive phase of protocol. 2.  Patient will increase PROM right shoulder abduction to 90 deg prevent contracture during passive phase of protocol. 3.  Patient will be compliant with HEP. 4.  Patient will increase PROM right shoulder ER to 25 deg to prevent contracture during passive phase of protocol. · Long Term Goals: To be accomplished in  4  weeks:  1. Patient will increase PROM right shoulder flexion to 120 degrees to prevent contracture during passive phase of protocol. 2.  Patient will increase PROM right shoulder abduction to 110 deg prevent contracture during passive phase of protocol. 3.  Patient will be compliant with HEP. 4.  Patient will increase PROM right shoulder ER to 50 deg to prevent contracture during passive phase of protocol.        PLAN    []  Upgrade activities as tolerated YES Continue plan of care   []  Discharge due to :    []  Other:      Therapist: Jessika Mcgee PT    Date: 2/3/2022 Time: 3:49 PM     Future Appointments   Date Time Provider Bhavani Vazquez 2/3/2022  4:15 PM Memory Sammie, PT [facility]   2/7/2022  5:00 PM [address] [facility]   2/8/2022  5:00 PM Memory Sammie, PT [facility]   2/15/2022  5:00 PM Shayla Cochran, PT [facility]   8/17/2022  1:00 PM [facility] WAYNE STEREO BX RM [address]

## 2022-02-07 ENCOUNTER — HOSPITAL ENCOUNTER (OUTPATIENT)
Dept: PHYSICAL THERAPY | Age: 57
Discharge: HOME | End: 2022-02-07
Payer: COMMERCIAL

## 2022-02-07 PROCEDURE — 97110 THERAPEUTIC EXERCISES: CPT

## 2022-02-07 NOTE — PROGRESS NOTES
PHYSICAL THERAPY - DAILY TREATMENT NOTE    Patient Name: Mary Urban        Date: 2022  : 1965   YES Patient  Verified  Visit #:   3   of   8  Insurance: Payor: Colton Casillas / Plan: Itz Harden RPN / Product Type: Commerical /      In time: 445 Out time: 520   Total Treatment Time: 35     Medicare/BCBS Time Tracking (below)   Total Timed Codes (min):  35 1:1 Treatment Time:  35     TREATMENT AREA =  Right arm pain [M79.601]    SUBJECTIVE    Pain Level (on 0 to 10 scale):  2-3  / 10   Medication Changes/New allergies or changes in medical history, any new surgeries or procedures? NO    If yes, update Summary List   Subjective Functional Status/Changes:  []  No changes reported     Pt reported that she has been compliant with HEP     OBJECTIVE  Therapeutic Procedures:  Min Procedure Specifics + Rationale   35(35) [x] Therapeutic Exercise    See Flowsheet   Rationale: increase ROM and increase strength to improve the patients ability to participate in ADL's       min Patient Education:  YES Reviewed HEP         Other Objective/Functional Measures:    See flowsheet for more details    Patient Education regarding the following during session:  1.post-op precaution and prognosis    (includes PROM right shoulder IR/ER, flex/scap)     VC and demos required throughout session for proper form and increased safety         Post Treatment Pain Level (on 0 to 10) scale:   0  / 10     ASSESSMENT    Assessment/Changes in Function:     Pt progress in PROM is limited by apprehension. However apprehension decreased with cuing and time.       []  See Progress Note/Recertification   Patient will continue to benefit from skilled PT services to analyze,, cue,, progress,, modify,, demonstrate,, instruct, and address, movement patterns,, therapeutic interventions,, postural abnormalities,, soft tissue restrictions,, ROM,, strength,, functional mobility,, body mechanics/ergonomics, and home and community integration, to attain remaining goals.    Progress toward goals / Updated goals:    PROM improving     PLAN    [x]  Upgrade activities as tolerated YES Continue plan of care   []  Discharge due to :    [x]  Other: Modified STG 1 secondary to potential typo (190 deg to 90 deg goal)     Therapist: Aida Hodges [id number]    Date: 2/7/2022 Time: 4:20 PM     Future Appointments   Date Time Provider Bhavani Vazquez   2/7/2022  5:00 PM Brit [facility]   2/8/2022  7:15 AM Olaf Rollins PTA [facility] [address]   2/15/2022  5:00 PM Caryn Ziegler, PT [facility]   8/17/2022  1:00 PM [facility] WAYNE STEREO [address]

## 2022-02-08 ENCOUNTER — HOSPITAL ENCOUNTER (OUTPATIENT)
Dept: PHYSICAL THERAPY | Age: 57
Discharge: HOME | End: 2022-02-08
Payer: COMMERCIAL

## 2022-02-08 PROCEDURE — 97140 MANUAL THERAPY 1/> REGIONS: CPT

## 2022-02-08 PROCEDURE — 97110 THERAPEUTIC EXERCISES: CPT

## 2022-02-08 NOTE — PROGRESS NOTES
PHYSICAL THERAPY - DAILY TREATMENT NOTE    Patient Name: Clif Dietrich        Date: 2022  : 1965   yes Patient  Verified  Visit #:   4   of   8  Insurance: Payor: Antoinette Whitten / Plan: Sandi SIMS / Product Type: Commerical /      In time: 717 Out time: 727   Total Treatment Time: 46     Medicare/[facility] Time Tracking (below)   Total Timed Codes (min):  46 1:1 Treatment Time:  46     TREATMENT AREA =  Right arm pain [M79.601]    SUBJECTIVE  Pain Level (on 0 to 10 scale):  0  / 10   Medication Changes/New allergies or changes in medical history, any new surgeries or procedures? yes  If yes, update Summary List   Subjective Functional Status/Changes:  []  No changes reported     \"I slept great last night. I am still in the chair sleeping. I am doing my HEP everyday\"          OBJECTIVE  Modalities Rationale: PD  36 min Therapeutic Exercise:  [x]  See flow sheet   Rationale:      increase ROM and increase strength to improve the patients ability to  maintain ROM to facilitate ADLs per protocol      10 min Manual Therapy: STM to RIght UT/pecs/scap border/biceps, scap framing, PROM with oscillations + distraction per protocol    Rationale:      decrease pain, increase ROM, increase tissue extensibility, decrease trigger points and increase postural awareness to improve patient's ability to  maintain ROM to facilitate ADLs per protocol         Billed With/As:   [x] TE   [] TA   [] Neuro   [] Self Care Patient Education: [x] Review HEP    [] Progressed/Changed HEP based on:   [x] positioning   [x] body mechanics   [x] transfers   [] heat/ice application    [x] other: Pt ed on importance and benefits of compliance with HEP, core strength/stability and proper posture; pt verbalized understanding     Other Objective/Functional Measures:  VCs + demo to perform proper technique for TE  initiated bicep curls/triceps; and increased reps without c/o p!  pt request to hold PROM flex at ~ 95 degs due to 1/10 p! level, advised pt to allow more stretch to avoid contracture  PROM Sh scap ~ 100 degs; abd 90 degs; ER @ 45 degs of Abd- 7 degs     Post Treatment Pain Level (on 0 to 10) scale:   0  / 10     ASSESSMENT  Assessment/Changes in Function:   increased flex PROM 55 degs, scap/abd=35/30 degs; and ER 2 degs     []  See Progress Note/Recertification   Patient will continue to benefit from skilled PT services to modify and progress therapeutic interventions, address functional mobility deficits, address ROM deficits, address strength deficits, analyze and address soft tissue restrictions, analyze and cue movement patterns, analyze and modify body mechanics/ergonomics, assess and modify postural abnormalities and instruct in home and community integration to attain remaining goals. Progress toward goals / Updated goals:  Progressing toward goals: · Short Term Goals: To be accomplished in  2  weeks:  1.  Patient will increase PROM right shoulder flexion to 190 degrees to prevent contracture during passive phase of protocol. progressing, 95 degs  2.  Patient will increase PROM right shoulder abduction to 90 deg prevent contracture during passive phase of protocol. MET- 90 degs  3.  Patient will be compliant with HEP. 4.  Patient will increase PROM right shoulder ER to 25 deg to prevent contracture during passive phase of protocol.  progressing, increased 2 degs     PLAN  [x]  Upgrade activities as tolerated yes Continue plan of care   []  Discharge due to :    []  Other:      Therapist: Laura Dunaway PTA    Date: 2/8/2022 Time: 7:32 AM     Future Appointments   Date Time Provider Bhavani Vazquez   2/15/2022  5:00 PM Dwight Solano PT [facility]   8/17/2022  1:00 PM [facility] [facility]

## 2022-02-14 ENCOUNTER — APPOINTMENT (OUTPATIENT)
Dept: PHYSICAL THERAPY | Age: 57
End: 2022-02-14
Payer: COMMERCIAL

## 2022-02-15 ENCOUNTER — HOSPITAL ENCOUNTER (OUTPATIENT)
Dept: PHYSICAL THERAPY | Age: 57
Discharge: HOME | End: 2022-02-15
Payer: COMMERCIAL

## 2022-02-15 PROCEDURE — 97140 MANUAL THERAPY 1/> REGIONS: CPT

## 2022-02-15 PROCEDURE — 97110 THERAPEUTIC EXERCISES: CPT

## 2022-02-15 NOTE — PROGRESS NOTES
PHYSICAL THERAPY - DAILY TREATMENT NOTE    Patient Name: Chayo Lester        Date: 2/15/2022  : 1965   YES Patient  Verified  Visit #:   5   of   8  Insurance: Payor: Yaritza Adjutant / Plan: Zoltan SIMS / Product Type: Commerical /      In time: 5:00 Out time: 5:35   Total Treatment Time: 35     Medicare/BCBS [place] Time Tracking (below)   Total Timed Codes (min):  35 1:1 Treatment Time:  35     TREATMENT AREA =  Right arm pain [M79.601]  SUBJECTIVE    Pain Level (on 0 to 10 scale):  0  / 10   Medication Changes/New allergies or changes in medical history, any new surgeries or procedures? NO    If yes, update Summary List   Subjective Functional Status/Changes:  []  No changes reported     Pt denies pain. OBJECTIVE    20 min Therapeutic Exercise:  [x]  See flow sheet   Rationale:      increase ROM and increase strength to improve the patients ability to perform ADLs/IADLs, functional mobility safely and independently without increased pain/symptoms     5 min Therapeutic Activity: FOTO   Rationale: assess ADL/IADL function to improve the patient;s ability to perform ADLs/IADLs, functional mobility safely and independently without increased pain/symptoms      10 min Manual Therapy: PROM right shoulder flex/abd/ER/IR   Rationale:      increase ROM and increase tissue extensibility to improve patient's ability to perform ADLs/IADLs, functional mobility safely and independently without increased pain/symptoms  The manual therapy interventions were performed at a separate and distinct time from the therapeutic activities interventions. During  TE min Patient Education:  YES  Reviewed HEP   []  Progressed/Changed HEP based on:   Cont HEP     Other Objective/Functional Measures:    Exercises per flow sheet    FOTO = 46/100, stage 3, fair shoulder function    ROM:  []?  Unable to assess at this time                                           AROM (deg) PROM (deg)    Left Right   Left Right   Flexion     Flexion  165 120   Extension     Extension NT  NT   Scaption/ABD     Scaption/ 105   ER @ 0 Degrees     ER @ 45 Degrees abd  75 5   ER @ 90 Degrees     ER @ 90 Degrees  NT NT   IR @ 90 Degrees     IR @ 45 Degrees abd  70 70           Post Treatment Pain Level (on 0 to 10) scale:   1  / 10     ASSESSMENT    Assessment/Changes in Function:     See progress note     [x]  See Progress Note/Recertification   Patient will continue to benefit from skilled PT services: see progress note   Progress toward goals / Updated goals:    See progress note     PLAN    [x]  Upgrade activities as tolerated YES Continue plan of care   []  Discharge due to :    []  Other:      Therapist: Jason Watts PT    Date: 2/15/2022 Time: 5:08 PM     Future Appointments   Date Time Provider Bhavani Vazquez   8/17/2022  1:00 PM [facility] [address]NE STEREO [address]

## 2022-02-15 NOTE — PROGRESS NOTES
[facility] PHYSICAL THERAPY  [address] [place] [address] Marychuy Kirkland, [address] - Phone: [phone]  Fax: [phone]  PROGRESS NOTE  Patient Name: Ramon Cuevas : 1965   Treatment/Medical Diagnosis: Right arm pain [M79.601]; right shoulder pain [M25.511]   Referral Source: Kenny Azevedo MD     Date of Initial Visit: 2022 Attended Visits: 5 Missed Visits: 0     SUMMARY OF TREATMENT  Treatment has consisted of initial evaluation and 4 treatment sessions, which have included ROM/stretching/strengthening exercises, manual therapy techniques, modalities for pain relief and patient education, including HEP. CURRENT STATUS  Patient has progressed well with exercises in clinic and demonstrates compliance with HEP. Patient is demonstrating increased ROM right shoulder. FOTO has improved to 46/100, stage 3, fair shoulder function. Goal/Measure of Progress Goal Met? 1. Patient will increase PROM right shoulder flexion to 90 degrees to prevent contracture during passive phase of protocol. Status at last Eval: 50 degrees Current Status: 120 degrees yes   2. Patient will increase PROM right shoulder abduction to 90 degrees to prevents contracture during passive phase of protocol. Status at last Eval: 60 degrees Current Status: 105 degrees yes   3. Patient will be compliant with HEP. Status at last Eval: NA Current Status: Compliant with HEP yes   4. Patient will increase PROM right shoulder ER to 25 degrees to prevent contracture during passive phase of protocol. Status at last Eval: 5 degrees Current Status: 5 degrees no     New Goals to be achieved in __2-4__  weeks:  1. Patient will increase PROM right shoulder flexion to 120 degrees to prevent contracture during passive phase of protocol. 2.  Patient will increase PROM right shoulder abduction to 110 degrees to prevent contracture during passive phase of protocol.    3.  Patient will be independent with HEP. 4.  Patient will increase PROM right shoulder ER to 50 degrees to prevent contracture during passive phase of protocol. RECOMMENDATIONS  Patient would benefit from continued skilled PT services to address pain, decreased ROM, decreased strength, decreased balance, decreased position/activity tolerance and impaired functional mobility/gait to improve the patient's ability to perform ADLs/IADLs, functional mobility and gait safely and independently without increased pain/symptoms. If you have any questions/comments please contact us directly at [phone]. Thank you for allowing us to assist in the care of your patient. Therapist Signature: Bill Arvizu, PT Date: 2/15/2022     Time: 3:50 PM   NOTE TO PHYSICIAN:  PLEASE COMPLETE THE ORDERS BELOW AND FAX TO   [facility]: [phone]. If you are unable to process this request in 24 hours please contact our office: [phone].    ___ I have read the above report and request that my patient continue as recommended.   ___ I have read the above report and request that my patient continue therapy with the following changes/special instructions:_________________________________________________________   ___ I have read the above report and request that my patient be discharged from therapy.      Physician Signature:        Date:       Time:

## 2022-02-28 ENCOUNTER — HOSPITAL ENCOUNTER (OUTPATIENT)
Dept: PHYSICAL THERAPY | Age: 57
Discharge: HOME | End: 2022-02-28
Payer: COMMERCIAL

## 2022-02-28 PROCEDURE — 97110 THERAPEUTIC EXERCISES: CPT

## 2022-02-28 PROCEDURE — 97140 MANUAL THERAPY 1/> REGIONS: CPT

## 2022-02-28 NOTE — PROGRESS NOTES
PHYSICAL THERAPY - DAILY TREATMENT NOTE    Patient Name: Fili Banda        Date: 2022  : 1965   YES Patient  Verified  Visit #:   6   of   13  Insurance: Payor: Alvin Carrasquillo / Plan: Shakeel Muniz RPN / Product Type: Commerical /      In time: 5:05 Out time: 5:44   Total Treatment Time: 39     Medicare/[employer] [place] Time Tracking (below)   Total Timed Codes (min):  39 1:1 Treatment Time:  39     TREATMENT AREA =  Right arm pain [M79.601]    SUBJECTIVE    Pain Level (on 0 to 10 scale):  0  / 10   Medication Changes/New allergies or changes in medical history, any new surgeries or procedures? NO    If yes, update Summary List   Subjective Functional Status/Changes:  []  No changes reported     Pt reports saw the shoulder doctor on Friday, they told her to only wear the sling she needs it. Pt states she just started back to work, she is wearing her sling so that she doesn't try and use the right arm too much. Pt reports doctor's assistant told her she can't push, pull or lift. OBJECTIVE    29 min Therapeutic Exercise:  [x]  See flow sheet   Rationale:    increase ROM, increase strength, improve coordination, improve balance and increase proprioception to promote increased functional mobility and increased activity tolerance with ADL's. 10 min Manual Therapy: The manual therapy interventions were performed at a separate and distinct time from the therapeutic activities interventions. grade 3 post and inf GH mobs, PROM flex, abd and ER right shoulder, Patient supine with LE wedge   Rationale:  decrease pain, increase tissue extensibility and decrease trigger points to improve patient's ability to perform ADL's with greater ease and less pain. min Patient Education:  YES  Reviewed HEP   []  Progressed/Changed HEP based on:   Pt to assess response to ex progression and will update HEP accordingly.       Other Objective/Functional Measures:    Initiated AAROM shoulder flexion and gentle shoulder isometrics. Max VCing for form. Pt without c/o pain with therapeutic exercise. Post Treatment Pain Level (on 0 to 10) scale:   0  / 10     ASSESSMENT    Assessment/Changes in Function:     Pt with good tolerance to progression of therapeutic exercise      []  See Progress Note/Recertification   Patient will continue to benefit from skilled PT services to modify and progress therapeutic interventions, address functional mobility deficits, address ROM deficits, address strength deficits, analyze and address soft tissue restrictions, analyze and cue movement patterns, assess and modify postural abnormalities, and instruct in home and community integration to attain remaining goals. Progress toward goals / Updated goals:    1. Patient will increase PROM right shoulder flexion to 120 degrees to prevent contracture during passive phase of protocol. initiated AAROM shoulder flex   2. Patient will increase PROM right shoulder abduction to 110 degrees to prevent contracture during passive phase of protocol. 3.  Patient will be independent with HEP.    4.  Patient will increase PROM right shoulder ER to 50 degrees to prevent contracture during passive phase of protocol.           PLAN    []  Upgrade activities as tolerated YES Continue plan of care   []  Discharge due to :    []  Other:      Therapist: Bubba Montenegro PTA    Date: 2/28/2022 Time: 6:00 PM     Future Appointments   Date Time Provider Bhavani Vazquez   3/7/2022  5:00 PM Carroll Guardado PTA [facility]   3/14/2022  5:00 PM Carroll Guardado PTA [facility]   3/15/2022  5:00 PM Hamilton Oliver PT [facility]   3/21/2022  5:00 PM Carroll Guardado PTA [facility]   3/22/2022  5:00 PM Hamilton Oliver PT [facility]   3/28/2022  5:00 PM Carroll Guardado PTA [facility]   3/29/2022  5:00 PM Hamilton Oliver PT [facility]   8/17/2022  1:00 PM [facility] WAYNE STEREO BX RM 1 [address]

## 2022-03-07 ENCOUNTER — HOSPITAL ENCOUNTER (OUTPATIENT)
Dept: PHYSICAL THERAPY | Age: 57
Discharge: HOME | End: 2022-03-07
Payer: COMMERCIAL

## 2022-03-07 PROCEDURE — 97110 THERAPEUTIC EXERCISES: CPT

## 2022-03-07 PROCEDURE — 97140 MANUAL THERAPY 1/> REGIONS: CPT

## 2022-03-07 NOTE — PROGRESS NOTES
PHYSICAL THERAPY - DAILY TREATMENT NOTE    Patient Name: Edison Klinefelter        Date: 3/7/2022  : 1965   yes Patient  Verified  Visit #:   7   of   10  Insurance: Payor: William Keller / Plan: Gabriel Muñoz RPN / Product Type: Commerical /      In time: 455 Out time: 535   Total Treatment Time: 40     Medicare/BCBS Time Tracking (below)   Total Timed Codes (min):  40 1:1 Treatment Time:  40     TREATMENT AREA =  Right arm pain [M79.601]    SUBJECTIVE  Pain Level (on 0 to 10 scale):  0  / 10   Medication Changes/New allergies or changes in medical history, any new surgeries or procedures? yes  If yes, update Summary List   Subjective Functional Status/Changes:  []  No changes reported     \"I slept great last night. I am still in the chair sleeping. I am doing my HEP everyday\"          OBJECTIVE  Modalities Rationale: PD  30 min Therapeutic Exercise:  [x]  See flow sheet   Rationale:      increase ROM and increase strength to improve the patients ability to  maintain ROM to facilitate ADLs per protocol      10 min Manual Therapy: STM to RIght UT/pecs/scap border/biceps, scap framing, PROM with oscillations + distraction per protocol    Rationale:      decrease pain, increase ROM, increase tissue extensibility, decrease trigger points and increase postural awareness to improve patient's ability to  maintain ROM to facilitate ADLs per protocol       Billed With/As:   [x] TE   [] TA   [] Neuro   [] Self Care Patient Education: [x] Review HEP    [] Progressed/Changed HEP based on:   [x] positioning   [x] body mechanics   [x] transfers   [] heat/ice application    [x] other: Pt ed on importance and benefits of compliance with HEP, core strength/stability and proper posture; pt verbalized understanding     Other Objective/Functional Measures:  VCs + demo to perform proper technique for TE  increased reps without c/o p!   SH abd PROM ~ 95 degs with firm end feel     Post Treatment Pain Level (on 0 to 10) scale:   0 / 10     ASSESSMENT  Assessment/Changes in Function:   achieved STG #1  Progressed TE without c/o increase p! []  See Progress Note/Recertification   Patient will continue to benefit from skilled PT services to modify and progress therapeutic interventions, address functional mobility deficits, address ROM deficits, address strength deficits, analyze and address soft tissue restrictions, analyze and cue movement patterns, analyze and modify body mechanics/ergonomics, assess and modify postural abnormalities and instruct in home and community integration to attain remaining goals. Progress toward goals / Updated goals:    New Goals to be achieved in __2-4__  weeks from 2/15/22:  1. Patient will increase PROM right shoulder flexion to 120 degrees to prevent contracture during passive phase of protocol. MET -120 degs   2. Patient will increase PROM right shoulder abduction to 110 degrees to prevent contracture during passive phase of protocol. progressing ~ 95 degs   3. Patient will be independent with HEP.    4.  Patient will increase PROM right shoulder ER to 50 degrees to prevent contracture during passive phase of protocol          PLAN  [x]  Upgrade activities as tolerated yes Continue plan of care   []  Discharge due to :    []  Other:      Therapist: Zeferino Locke PTA    Date: 3/7/2022 Time: 7:32 AM     Future Appointments   Date Time Provider Bhavani Vazquez   3/14/2022  5:00 PM Franchot Smoker, PTA [facility]   3/15/2022  5:00 PM Ev Montoya, PT [facility]   3/21/2022  5:00 PM Franchot Smoker, PTA [facility]   3/22/2022  5:00 PM Ev Montoya, PT [facility]   3/28/2022  5:00 PM Franchot Smoker, PTA [facility]   3/29/2022  5:00 PM Ev Montoya, PT [facility]   8/17/2022  1:00 PM [facility]

## 2022-03-14 ENCOUNTER — APPOINTMENT (OUTPATIENT)
Dept: PHYSICAL THERAPY | Age: 57
End: 2022-03-14
Payer: COMMERCIAL

## 2022-03-15 ENCOUNTER — HOSPITAL ENCOUNTER (OUTPATIENT)
Dept: PHYSICAL THERAPY | Age: 57
Discharge: HOME | End: 2022-03-15
Payer: COMMERCIAL

## 2022-03-15 PROCEDURE — 97110 THERAPEUTIC EXERCISES: CPT

## 2022-03-15 PROCEDURE — 97530 THERAPEUTIC ACTIVITIES: CPT

## 2022-03-15 NOTE — PROGRESS NOTES
PHYSICAL THERAPY - DAILY TREATMENT NOTE    Patient Name: Magdy Johnston        Date: 3/15/2022  : 1965   YES Patient  Verified  Visit #:     Insurance: Payor: Saira Shen / Plan: Jw Cruz RPN / Product Type: Commerical /      In time: 4:55 Out time: 5:30   Total Treatment Time: 35     Medicare/[facility] Time Tracking (below)   Total Timed Codes (min):  35 1:1 Treatment Time:  35     TREATMENT AREA =  Right arm pain [M79.601]  SUBJECTIVE    Pain Level (on 0 to 10 scale):  0  / 10   Medication Changes/New allergies or changes in medical history, any new surgeries or procedures? NO    If yes, update Summary List   Subjective Functional Status/Changes:  []  No changes reported     Pt reports that when she saw her surgeon they told her that she only needed to wear her sling if she needs it and that she can move her arm but not overhead. Pt reports that she has new script but left it at home.        OBJECTIVE    22 min Therapeutic Exercise:  [x]  See flow sheet (includes PROM right shoulder flex/abd/ER)   Rationale:      increase ROM and increase strength to improve the patients ability to perform ADLs/IADLs without increased pain/symptoms     13 min Therapeutic Activity: FOTO   Rationale: assess ADL/IADL function to improve the patient's ability to perform ADLs/IADLs without increased pain/symptoms      During TE min Patient Education:  YES  Reviewed HEP   []  Progressed/Changed HEP based on:   Cont HEP     Other Objective/Functional Measures:    Exercises per flow sheet    Attempted to call Dr. Tavera Star office to clarify progression  - office closed    ROM:  []?? Unable to assess at this time                                           AROM (deg)                                                    PROM (deg)    Left Right   Left Right   Flexion     Flexion    Extension     Extension NT NT   Scaption/ABD     Scaption/ABD    ER @ 0 Degrees     ER @ 45 Degrees abd NT 30   ER @ 90 Degrees     ER @ 90 Degrees NT NT   IR @ 90 Degrees     IR @ 45 Degrees abd NT NT      FOTO = 51/100, stage 3, fair shoulder function     Post Treatment Pain Level (on 0 to 10) scale:   0  / 10     ASSESSMENT    Assessment/Changes in Function:     See progress note     []  See Progress Note/Recertification   Patient will continue to benefit from skilled PT services to modify and progress therapeutic interventions, address functional mobility deficits, address ROM deficits, address strength deficits, analyze and address soft tissue restrictions, analyze and cue movement patterns, analyze and modify body mechanics/ergonomics, assess and modify postural abnormalities, address imbalance/dizziness and instruct in home and community integration to attain remaining goals. Progress toward goals / Updated goals:    See progress note:  Progressing toward goals:  1.  Patient will increase PROM right shoulder flexion to 120 degrees to prevent contracture during passive phase of protocol. 130   2.  Patient will increase PROM right shoulder abduction to 110 degrees to prevent contracture during passive phase of protocol. 100   3.  Patient will be independent with HEP.    4.  Patient will increase PROM right shoulder ER to 50 degrees to prevent contracture during passive phase of protocol 30        PLAN    [x]  Upgrade activities as tolerated YES Continue plan of care   []  Discharge due to :    []  Other:      Therapist: Sheri Anderson PT    Date: 3/15/2022 Time: 4:50 PM     Future Appointments   Date Time Provider Bhavani Vazquez   3/15/2022  5:00 PM Hayes Veras PT [facility]   3/21/2022  5:00 PM Rosita Granados PTA [facility]   3/22/2022  5:00 PM Hayes Veras PT [facility]   3/28/2022  5:00 PM Rosita Granados PTA [facility]   3/29/2022  5:00 PM Hayes Veras PT [facility]   8/17/2022  1:00 PM [address] WAYNE STEREO BX RM [address] [facility] [address]

## 2022-03-15 NOTE — PROGRESS NOTES
[facility] PHYSICAL THERAPY  [address] [place] [address] [place], [address] - Phone: [phone]  Fax: [phone]  PROGRESS NOTE  Patient Name: Jamar Urbina : 1965   Treatment/Medical Diagnosis: Right arm pain [M79.601]   Referral Source: Bert Marte MD     Date of Initial Visit: 2022 Attended Visits: 8 Missed Visits: 1     SUMMARY OF TREATMENT  Treatment has consisted of initial evaluation and 7 treatment sessions, which have included ROM/stretching/distal strengthening exercises, manual therapy techniques, modalities for pain relief and patient education (including HEP). Patient was not seen from 2/15/2022 to 2022 due to awaiting insurance authorization. CURRENT STATUS  Patient has progressed slowly with exercises in clinic and reports compliance with HEP. Patient presented for re-evaluation this session. Patient reported that she had seen surgeon and received new prescription for PT, but had left prescription at home. Patient reports that she was told that she does not have to wear her sling and that she can use her arm below shoulder level, but cannot push/pull/lift. Attempted to call surgeon to clarify new orders; however, office closed for the day. Will attempt to call tomorrow. FOTO has improved from 46/100 to 51/100. Right shoulder ROM improved (see below). ROM:  []??? Unable to assess at this time                                           AROM (deg)                                                    PROM (deg)    Left Right   Left Right   Flexion     Flexion    Extension     Extension NT NT   Scaption/ABD     Scaption/ABD    ER @ 0 Degrees     ER @ 45 Degrees abd NT 30   ER @ 90 Degrees     ER @ 90 Degrees NT NT   IR @ 90 Degrees     IR @ 45 Degrees abd NT NT        Goal/Measure of Progress Goal Met? 1.   Patient will increase PROM right shoulder flexion to 120 degrees to prevent contracture during passive phase of protocol. Status at last Eval: 120 Current Status: 130 yes   2. Patient will increase PROM right shoulder abduction to 110 degrees to prevent contracture during passive phase of protocol. Status at last Eval: 105 Current Status: 100 no   3. Patient will be independent with HEP. Status at last Eval: Compliant Current Status: Independent yes   4. Patient will increase PROM right shoulder ER to 50 degrees to prevent contracture during passive phase of protocol. Status at last Eval: 5 Current Status: 30 progressing     New Goals to be achieved in __2-4__  weeks:  1. Patient will demonstrate 90 degrees right shoulder flexion AROM to facilitate overhead reach. 2.  Patient will demonstrate 75 degrees right shoulder abduction AROM to facilitate lateral reach. 3.  Patient will demonstrate 30 degrees right shoulder ER AROM to facilitate ADLs. RECOMMENDATIONS  Patient would benefit from continued skilled PT services to address pain, decreased ROM, decreased strength and decreased activity tolerance to improve the patient's ability to perform ADLs/IADLs and functional mobility safely and independently without increased pain/symptoms. Will continue to attempt to reach surgeon to clarify progression to AAROM/AROM/PRE right shoulder as original prescription stated PROM only. If you have any questions/comments please contact us directly at [phone]. Thank you for allowing us to assist in the care of your patient. Therapist Signature: Nanette Ambrose PT Date: 3/15/2022     Time: 5:42 PM   NOTE TO PHYSICIAN:  PLEASE COMPLETE THE ORDERS BELOW AND FAX TO   [facility]: [phone].   If you are unable to process this request in 24 hours please contact our office: [phone].    ___ I have read the above report and request that my patient continue as recommended.   ___ I have read the above report and request that my patient continue therapy with the following changes/special instructions:_________________________________________________________   ___ I have read the above report and request that my patient be discharged from therapy.      Physician Signature:        Date:       Time:

## 2022-03-21 ENCOUNTER — HOSPITAL ENCOUNTER (OUTPATIENT)
Dept: PHYSICAL THERAPY | Age: 57
Discharge: HOME | End: 2022-03-21
Payer: COMMERCIAL

## 2022-03-21 PROCEDURE — 97110 THERAPEUTIC EXERCISES: CPT

## 2022-03-21 PROCEDURE — 97140 MANUAL THERAPY 1/> REGIONS: CPT

## 2022-03-21 NOTE — PROGRESS NOTES
PHYSICAL THERAPY - DAILY TREATMENT NOTE    Patient Name: Yana Nam        Date: 3/21/2022  : 1965   yes Patient  Verified  Visit #:   9   of   10  Insurance: Payor: Savannah Leigh / Plan: Marcin SIMS / Product Type: Commerical /      In time: 455 Out time: 600   Total Treatment Time: 65     Medicare/[facility] Time Tracking (below)   Total Timed Codes (min):  65 1:1 Treatment Time:  65     TREATMENT AREA =  Right arm pain [M79.601]    SUBJECTIVE  Pain Level (on 0 to 10 scale):  0  / 10   Medication Changes/New allergies or changes in medical history, any new surgeries or procedures?     yes  If yes, update Summary List   Subjective Functional Status/Changes:  []  No changes reported     \"Its tight at ~ here (90 degs), I stretched it some over the weekend, I will stretch more\"       OBJECTIVE  Modalities Rationale: PD  50 min Therapeutic Exercise:  [x]  See flow sheet   Rationale:      increase ROM and increase strength to improve the patients ability to  maintain ROM to facilitate ADLs per protocol      15 min Manual Therapy: STM> IASTM with FRAMs F1-3 to RIght UT/pecs/scap border/biceps, scap framing, AAROM with oscillations + distraction per protocol    Rationale:      decrease pain, increase ROM, increase tissue extensibility, decrease trigger points and increase postural awareness to improve patient's ability to  maintain ROM to facilitate ADLs per protocol       Billed With/As:   [x] TE   [] TA   [] Neuro   [] Self Care Patient Education: [x] Review HEP    [] Progressed/Changed HEP based on:   [x] positioning   [x] body mechanics   [x] transfers   [] heat/ice application    [x] other: Pt ed on importance and benefits of compliance with HEP, core strength/stability and proper posture; pt verbalized understanding     Other Objective/Functional Measures:  VCs + demo to perform proper technique for TE  Right SH AAROM flex/scap on pulleys to ~  degs, CCs to decrease UT compensation and increase scap facilitation   Initiated TE per flowsheet per MD's recommendations   advised pt to stretch SH every 1 -2 hrs to promote AROM; pt verbalized understanding     Post Treatment Pain Level (on 0 to 10) scale:   0  / 10     ASSESSMENT  Assessment/Changes in Function:   firm end feel at ~ 95 degs    Progressed TE without c/o increase p! []  See Progress Note/Recertification   Patient will continue to benefit from skilled PT services to modify and progress therapeutic interventions, address functional mobility deficits, address ROM deficits, address strength deficits, analyze and address soft tissue restrictions, analyze and cue movement patterns, analyze and modify body mechanics/ergonomics, assess and modify postural abnormalities and instruct in home and community integration to attain remaining goals.    Progress toward goals / Updated goals:    See PN last visit, no changes noted at this time       PLAN  [x]  Upgrade activities as tolerated yes Continue plan of care   []  Discharge due to :    []  Other:      Therapist: Lai Gregory PTA    Date: 3/21/2022 Time: 5:25 PM     Future Appointments   Date Time Provider Bhavani Vazquez   3/22/2022  5:00 PM Phil Hernandez PT [facility]   3/28/2022  5:00 PM Jairo Caputo PTA [facility]   3/29/2022  5:00 PM Phil Hernandez PT [facility]   4/4/2022  5:00 PM Jairo Caputo PTA [facility]   4/5/2022  5:00 PM Phil Hernandez PT [facility]   4/11/2022  5:00 PM Jairo Caputo PTA [facility]   4/12/2022  5:00 PM Phil Hernandez PT [facility]   4/18/2022  5:00 PM Jairo Caputo PTA [facility]   4/19/2022  4:15 PM Phil Hernandez PT [facility]   4/25/2022  5:00 PM Jairo Caputo, PTA [facility]   4/26/2022  5:00 PM Phil Hernandez, PT [facility]   8/17/2022  1:00 PM [facility] WAYNE MOREIRA BX RM [address]

## 2022-03-22 ENCOUNTER — HOSPITAL ENCOUNTER (OUTPATIENT)
Dept: PHYSICAL THERAPY | Age: 57
Discharge: HOME | End: 2022-03-22
Payer: COMMERCIAL

## 2022-03-22 PROCEDURE — 97140 MANUAL THERAPY 1/> REGIONS: CPT

## 2022-03-22 PROCEDURE — 97110 THERAPEUTIC EXERCISES: CPT

## 2022-03-22 NOTE — PROGRESS NOTES
PHYSICAL THERAPY - DAILY TREATMENT NOTE    Patient Name: Nallely Stephens        Date: 3/22/2022  : 1965   YES Patient  Verified  Visit #:   10   of   12-16  Insurance: Payor: Kyle Houston / Plan: Abdon SIMS / Product Type: Commerical /      In time: 5:00 Out time: 5:40   Total Treatment Time: 40     Medicare/[employer]BS [place] Time Tracking (below)   Total Timed Codes (min):  40 1:1 Treatment Time:  40     TREATMENT AREA =  Right arm pain [M79.601]  SUBJECTIVE    Pain Level (on 0 to 10 scale):  0  / 10   Medication Changes/New allergies or changes in medical history, any new surgeries or procedures? NO    If yes, update Summary List   Subjective Functional Status/Changes:  []  No changes reported     Pt reports right shoulder stiffness/soreness after last PT session. OBJECTIVE    25 min Therapeutic Exercise:  [x]  See flow sheet   Rationale:      increase ROM, increase strength and increase proprioception to improve the patients ability to perform ADLs/IADLs, functional mobility and gait safely and independently without increased pain/symptoms     15 min Manual Therapy: Grade 1-2 inferior glides, PROM flex/scap and ER in scap plane/at 90 deg abd right shoulder   Rationale:      decrease pain, increase ROM and increase tissue extensibility to improve patient's ability to perform ADLs/IADLs, functional mobility safely and independently without increased pain/symptoms  The manual therapy interventions were performed at a separate and distinct time from the therapeutic activities interventions.     During TE/MT min Patient Education:  YES  Reviewed HEP   []  Progressed/Changed HEP based on:   Cont HEP     Other Objective/Functional Measures:    Exercises per flow sheet     Post Treatment Pain Level (on 0 to 10) scale:   0  / 10     ASSESSMENT    Assessment/Changes in Function:     Tolerated exercises without c/o increased pain     []  See Progress Note/Recertification   Patient will continue to benefit from skilled PT services to modify and progress therapeutic interventions, address functional mobility deficits, address ROM deficits, address strength deficits, analyze and address soft tissue restrictions, analyze and cue movement patterns, analyze and modify body mechanics/ergonomics, assess and modify postural abnormalities, address imbalance/dizziness and instruct in home and community integration to attain remaining goals. Progress toward goals / Updated goals:    Progressing toward goals:  1. Patient will demonstrate 90 degrees right shoulder flexion AROM to facilitate overhead reach. 2.  Patient will demonstrate 75 degrees right shoulder abduction AROM to facilitate lateral reach. 3.  Patient will demonstrate 30 degrees right shoulder ER AROM to facilitate ADLs.             PLAN    [x]  Upgrade activities as tolerated YES Continue plan of care   []  Discharge due to :    []  Other:      Therapist: Bill Arvizu PT    Date: 3/22/2022 Time: 5:02 PM     Future Appointments   Date Time Provider Bhavani Vazquez   3/28/2022  5:00 PM Dave Hercules [facility]   3/29/2022  5:00 PM Diego Valles, PT [facility]   4/4/2022  5:00 PM Andrea Duff PTA [facility]   4/5/2022  5:00 PM Diego Valles, PT [facility]   4/11/2022  5:00 PM Andrea Duff PTA [facility]   4/12/2022  5:00 PM Diego Valles, PT [facility]   4/18/2022  5:00 PM Andrea Duff PTA [facility]   4/19/2022  4:15 PM Diego Valles, PT [facility]   4/25/2022  5:00 PM Andrea Duff PTA [facility]   4/26/2022  5:00 PM Diego Valles, PT [facility]   8/17/2022  1:00 PM [facility] WAYNE STEREO BX RM [address]

## 2022-03-28 ENCOUNTER — APPOINTMENT (OUTPATIENT)
Dept: PHYSICAL THERAPY | Age: 57
End: 2022-03-28
Payer: COMMERCIAL

## 2022-03-29 ENCOUNTER — APPOINTMENT (OUTPATIENT)
Dept: PHYSICAL THERAPY | Age: 57
End: 2022-03-29
Payer: COMMERCIAL

## 2022-03-31 ENCOUNTER — HOSPITAL ENCOUNTER (OUTPATIENT)
Dept: PHYSICAL THERAPY | Age: 57
Discharge: HOME | End: 2022-03-31
Payer: COMMERCIAL

## 2022-03-31 PROCEDURE — 97110 THERAPEUTIC EXERCISES: CPT

## 2022-03-31 PROCEDURE — 97140 MANUAL THERAPY 1/> REGIONS: CPT

## 2022-03-31 NOTE — PROGRESS NOTES
PHYSICAL THERAPY - DAILY TREATMENT NOTE    Patient Name: Britt Age        Date: 3/31/2022  : 1965   YES Patient  Verified  Visit #:     Insurance: Payor: Aure Raymundo / Plan: Марина Bustos RPN / Product Type: Commerical /      In time: 5:03 Out time: 5:48   Total Treatment Time: 45     Medicare/[facility] Time Tracking (below)   Total Timed Codes (min):  45 1:1 Treatment Time:  45     TREATMENT AREA =  Right arm pain [M79.601]  SUBJECTIVE    Pain Level (on 0 to 10 scale):  0  / 10   Medication Changes/New allergies or changes in medical history, any new surgeries or procedures? NO    If yes, update Summary List   Subjective Functional Status/Changes:  []  No changes reported     Pt denies pain. OBJECTIVE    35 min Therapeutic Exercise:  [x]  See flow sheet   Rationale:      increase ROM, increase strength and increase proprioception to improve the patients ability to perform ADLs/IADLs, functional mobility without increased pain/symptoms     10 min Manual Therapy: Grade 2-3 posterior glides, PROM flex/abd/IR/Er right shoulder   Rationale:      decrease pain, increase ROM and increase tissue extensibility to improve patient's ability to perform ADLs/IADLs, functional mobility without increased pain/symptoms  The manual therapy interventions were performed at a separate and distinct time from the therapeutic activities interventions.     During TE min Patient Education:  YES  Reviewed HEP   []  Progressed/Changed HEP based on:   Cont HEP     Other Objective/Functional Measures:    Exercises per flow sheet     Post Treatment Pain Level (on 0 to 10) scale:   0  / 10     ASSESSMENT    Assessment/Changes in Function:     Tolerated exercises without complaints     []  See Progress Note/Recertification   Patient will continue to benefit from skilled PT services to modify and progress therapeutic interventions, address functional mobility deficits, address ROM deficits, address strength deficits, analyze and address soft tissue restrictions, analyze and cue movement patterns, analyze and modify body mechanics/ergonomics, assess and modify postural abnormalities and instruct in home and community integration to attain remaining goals. Progress toward goals / Updated goals:    Progressing toward goals: - Continue ROM exercises  1.  Patient will demonstrate 90 degrees right shoulder flexion AROM to facilitate overhead reach. 2.  Patient will demonstrate 75 degrees right shoulder abduction AROM to facilitate lateral reach. 3.  Patient will demonstrate 30 degrees right shoulder ER AROM to facilitate ADLs.         PLAN    [x]  Upgrade activities as tolerated YES Continue plan of care   []  Discharge due to :    []  Other:      Therapist: Michelle Awan PT    Date: 3/31/2022 Time: 5:13 PM     Future Appointments   Date Time Provider Bhavani Vazquez   4/4/2022  5:00 PM Jack Cruz, PTA [facility]   4/5/2022  5:00 PM Chika Rivera, PT [facility]   4/11/2022  5:00 PM Jack Cruz, KIERSTEN [facility]   4/18/2022  5:00 PM Fidelina Leone [facility]   4/19/2022  4:15 PM Chika Rivera, PT [facility]   4/25/2022  5:00 PM Jack Cruz, KIERSTEN [facility]   4/26/2022  5:00 PM Chika Rivera, PT [facility]   8/17/2022  1:00 PM [address]EREO [address]

## 2022-04-04 ENCOUNTER — HOSPITAL ENCOUNTER (OUTPATIENT)
Dept: PHYSICAL THERAPY | Age: 57
Discharge: HOME | End: 2022-04-04
Payer: COMMERCIAL

## 2022-04-04 PROCEDURE — 97110 THERAPEUTIC EXERCISES: CPT

## 2022-04-04 PROCEDURE — 97140 MANUAL THERAPY 1/> REGIONS: CPT

## 2022-04-04 NOTE — PROGRESS NOTES
PHYSICAL THERAPY - DAILY TREATMENT NOTE    Patient Name: Blake Locke        Date: 2022  : 1965   YES Patient  Verified  Visit #:     Insurance: Payor: Rhona Pa / Plan: Abigail SIMS / Product Type: Commerical /      In time: 456 Out time: 545   Total Treatment Time: 49     Medicare/BCBS Time Tracking (below)   Total Timed Codes (min):  49 1:1 Treatment Time:  49     TREATMENT AREA =  Right arm pain [M79.601]    SUBJECTIVE    Pain Level (on 0 to 10 scale):  0  / 10   Medication Changes/New allergies or changes in medical history, any new surgeries or procedures? NO    If yes, update Summary List   Subjective Functional Status/Changes:  []  No changes reported     [place] Mem[place] denies any pain today. Confirmed next visit's appointment  Denies any soreness after treatment sessions    Compliance with HEP  Yes [x] No []         OBJECTIVE  Therapeutic Procedures:  Min Procedure Specifics + Rationale   39(39) [x] Therapeutic Exercise    See Flowsheet   Rationale: increase ROM and increase strength to improve the patients ability to participate in ADL's    10 [x]  Manual Therapy          Grade 2-3 posterior and inferior glides, PROM flex/abd/IR/Er right shoulder  Isometric assistance  Rationale: decrease pain, increase ROM, increase tissue extensibility, decrease trigger points and increase postural awareness to attain functional use and participation with ADL's    The manual therapy interventions were performed at a separate and distinct time from the therapeutic activities interventions.         min Patient Education:  YES Reviewed HEP         Other Objective/Functional Measures:    See flowsheet for more details    Added Supine punch secondary improve flexion strength/AROM     VC and demos required throughout session for proper form and increased safety         Post Treatment Pain Level (on 0 to 10) scale:   0  / 10     ASSESSMENT    Assessment/Changes in Function:     Decreasing cueing required for routine movements. No pain reported within session today       []  See Progress Note/Recertification   Patient will continue to benefit from skilled PT services to analyze,, cue,, progress,, modify,, demonstrate,, instruct, and address, movement patterns,, therapeutic interventions,, postural abnormalities,, soft tissue restrictions,, ROM,, strength,, functional mobility,, body mechanics/ergonomics, and home and community integration, to attain remaining goals. Progress toward goals / Updated goals:     Addressed AROM goals with stretches and mobility movements     PLAN    [x]  Upgrade activities as tolerated YES Continue plan of care   []  Discharge due to :    []  Other:      Therapist: Isaias Bryant DPT    Date: 4/4/2022 Time: 5:03 PM     Future Appointments   Date Time Provider Bhavani Vazquez   4/5/2022  5:00 PM Sandi Hogan PT [facility] [address]   4/11/2022  5:00 PM Jessee Moon PTA [facility] [address]   4/18/2022  5:00 PM Jessee Moon PTA [facility] [address]   4/19/2022  4:15 PM Sandi Hogan PT [facility] [address]   4/25/2022  5:00 PM Jessee Moon PTA [facility] [address]   4/26/2022  5:00 PM Sandi Hogan PT [facility] [address]   8/17/2022  1:00 PM [facility] STEREO [address] [place] [address]

## 2022-04-05 ENCOUNTER — APPOINTMENT (OUTPATIENT)
Dept: PHYSICAL THERAPY | Age: 57
End: 2022-04-05
Payer: COMMERCIAL

## 2022-04-11 ENCOUNTER — APPOINTMENT (OUTPATIENT)
Dept: PHYSICAL THERAPY | Age: 57
End: 2022-04-11
Payer: COMMERCIAL

## 2022-04-12 ENCOUNTER — APPOINTMENT (OUTPATIENT)
Dept: PHYSICAL THERAPY | Age: 57
End: 2022-04-12
Payer: COMMERCIAL

## 2022-04-18 ENCOUNTER — APPOINTMENT (OUTPATIENT)
Dept: PHYSICAL THERAPY | Age: 57
End: 2022-04-18
Payer: COMMERCIAL

## 2022-04-19 ENCOUNTER — APPOINTMENT (OUTPATIENT)
Dept: PHYSICAL THERAPY | Age: 57
End: 2022-04-19
Payer: COMMERCIAL

## 2022-04-25 ENCOUNTER — APPOINTMENT (OUTPATIENT)
Dept: PHYSICAL THERAPY | Age: 57
End: 2022-04-25
Payer: COMMERCIAL

## 2022-04-26 ENCOUNTER — APPOINTMENT (OUTPATIENT)
Dept: PHYSICAL THERAPY | Age: 57
End: 2022-04-26
Payer: COMMERCIAL

## 2022-05-05 NOTE — PROGRESS NOTES
[address] [place] [address] PHYSICAL THERAPY  [address] [facility] [place] [facility], [address] - Phone: [phone]  Fax: [phone] SUMMARY FOR PHYSICAL THERAPY          Patient Name: Primitivo Hawk : 1965   Treatment/Medical Diagnosis: Right arm pain [M79.601]   Referral Source: Neno Solano MD [facility]): 2022   Prior Hospitalization: See Medical History Provider #: [id number]   Medications: Verified on Patient Summary List   Visits from [facility]: 11 Missed Visits: 4       Dear Dr. Jose Cervantes under your direction, we have been providing physical therapy for your patient, for a diagnosis of right rotator cuff repair. The patient attended 11 visits and missed 4. Pt was last seen on 2022 and did not return to PT. She requested to be self-discharged on 2022 due to \"feeling much better\". Due to the inability to further their care from non-attendance, we are discharging the patient from physical therapy at this time. We appreciate the kind referral and would willingly work with this patient again, should they be able to arrange regular attendance. Your patient's health is our primary concern. Should you have any further questions or concerns, please feel free to contact me at your convenience. Sincerely,      Kira Bell DPT        Assessments/Recommendations: Discontinue therapy due to lack of attendance or compliance. If you have any questions/comments please contact us directly at [phone]. Thank you for allowing us to assist in the care of your patient. Therapist Signature:  Kira Bell DPT Date: 2022   Reporting Period: NA Time: 8:01 PM      Certification Period: NA       NOTE TO PHYSICIAN:  PLEASE COMPLETE THE ORDERS BELOW AND FAX TO   [facility]: [phone].   If you are unable to process this request in 24 hours please contact our office: [phone].    ___ I have read the above report and request that my patient be discharged from therapy.      Physician Signature:        Date:       Time:    Pete Frazier MD

## 2022-08-17 ENCOUNTER — HOSPITAL ENCOUNTER (OUTPATIENT)
Dept: WOMENS IMAGING | Age: 57
Discharge: HOME | End: 2022-08-17
Attending: FAMILY MEDICINE
Payer: COMMERCIAL

## 2022-08-17 DIAGNOSIS — Z12.31 ENCOUNTER FOR SCREENING MAMMOGRAM FOR BREAST CANCER: ICD-10-CM

## 2022-08-17 PROCEDURE — 77063 BREAST TOMOSYNTHESIS BI: CPT

## 2023-08-22 ENCOUNTER — HOSPITAL ENCOUNTER (OUTPATIENT)
Dept: WOMENS IMAGING | Facility: HOSPITAL | Age: 58
Discharge: HOME | End: 2023-08-25
Payer: COMMERCIAL

## 2023-08-22 VITALS — BODY MASS INDEX: 41.65 KG/M2 | HEIGHT: 65 IN | WEIGHT: 250 LBS

## 2023-08-22 DIAGNOSIS — Z12.31 VISIT FOR SCREENING MAMMOGRAM: ICD-10-CM

## 2023-08-22 PROCEDURE — 77063 BREAST TOMOSYNTHESIS BI: CPT

## 2024-08-27 ENCOUNTER — HOSPITAL ENCOUNTER (OUTPATIENT)
Dept: WOMENS IMAGING | Facility: HOSPITAL | Age: 59
Discharge: HOME | End: 2024-08-30
Payer: COMMERCIAL

## 2024-08-27 DIAGNOSIS — Z12.31 VISIT FOR SCREENING MAMMOGRAM: ICD-10-CM

## 2024-08-27 PROCEDURE — 77063 BREAST TOMOSYNTHESIS BI: CPT

## 2025-09-03 ENCOUNTER — HOSPITAL ENCOUNTER (OUTPATIENT)
Dept: WOMENS IMAGING | Facility: HOSPITAL | Age: 60
Discharge: HOME | End: 2025-09-06
Payer: COMMERCIAL

## 2025-09-03 DIAGNOSIS — Z12.31 ENCOUNTER FOR SCREENING MAMMOGRAM FOR MALIGNANT NEOPLASM OF BREAST: ICD-10-CM

## 2025-09-03 PROCEDURE — 77063 BREAST TOMOSYNTHESIS BI: CPT
